# Patient Record
Sex: FEMALE | Race: WHITE | NOT HISPANIC OR LATINO | Employment: OTHER | URBAN - METROPOLITAN AREA
[De-identification: names, ages, dates, MRNs, and addresses within clinical notes are randomized per-mention and may not be internally consistent; named-entity substitution may affect disease eponyms.]

---

## 2024-11-19 ENCOUNTER — APPOINTMENT (OUTPATIENT)
Dept: RADIOLOGY | Facility: CLINIC | Age: 89
End: 2024-11-19
Attending: EMERGENCY MEDICINE
Payer: COMMERCIAL

## 2024-11-19 ENCOUNTER — HOSPITAL ENCOUNTER (EMERGENCY)
Facility: CLINIC | Age: 89
Discharge: OTHER INSTITUTION | End: 2024-11-19
Attending: EMERGENCY MEDICINE | Admitting: EMERGENCY MEDICINE
Payer: COMMERCIAL

## 2024-11-19 VITALS
OXYGEN SATURATION: 95 % | SYSTOLIC BLOOD PRESSURE: 184 MMHG | WEIGHT: 118 LBS | RESPIRATION RATE: 25 BRPM | DIASTOLIC BLOOD PRESSURE: 82 MMHG | TEMPERATURE: 97.8 F | BODY MASS INDEX: 18.52 KG/M2 | HEIGHT: 67 IN | HEART RATE: 83 BPM

## 2024-11-19 DIAGNOSIS — R05.2 SUBACUTE COUGH: ICD-10-CM

## 2024-11-19 DIAGNOSIS — D64.9 ANEMIA, UNSPECIFIED TYPE: ICD-10-CM

## 2024-11-19 LAB
ALBUMIN SERPL BCG-MCNC: 3.3 G/DL (ref 3.5–5.2)
ALP SERPL-CCNC: 69 U/L (ref 40–150)
ALT SERPL W P-5'-P-CCNC: <5 U/L (ref 0–50)
ANION GAP SERPL CALCULATED.3IONS-SCNC: 10 MMOL/L (ref 7–15)
AST SERPL W P-5'-P-CCNC: 13 U/L (ref 0–45)
ATRIAL RATE - MUSE: 78 BPM
BASOPHILS # BLD AUTO: 0 10E3/UL (ref 0–0.2)
BASOPHILS NFR BLD AUTO: 0 %
BILIRUB SERPL-MCNC: 0.4 MG/DL
BUN SERPL-MCNC: 13.3 MG/DL (ref 8–23)
CALCIUM SERPL-MCNC: 8.6 MG/DL (ref 8.8–10.4)
CHLORIDE SERPL-SCNC: 104 MMOL/L (ref 98–107)
CREAT SERPL-MCNC: 0.88 MG/DL (ref 0.51–0.95)
DIASTOLIC BLOOD PRESSURE - MUSE: NORMAL MMHG
EGFRCR SERPLBLD CKD-EPI 2021: 62 ML/MIN/1.73M2
EOSINOPHIL # BLD AUTO: 0.2 10E3/UL (ref 0–0.7)
EOSINOPHIL NFR BLD AUTO: 3 %
ERYTHROCYTE [DISTWIDTH] IN BLOOD BY AUTOMATED COUNT: 15.9 % (ref 10–15)
GLUCOSE SERPL-MCNC: 108 MG/DL (ref 70–99)
HCO3 SERPL-SCNC: 24 MMOL/L (ref 22–29)
HCT VFR BLD AUTO: 30.3 % (ref 35–47)
HGB BLD-MCNC: 9.5 G/DL (ref 11.7–15.7)
IMM GRANULOCYTES # BLD: 0 10E3/UL
IMM GRANULOCYTES NFR BLD: 0 %
INTERPRETATION ECG - MUSE: NORMAL
LYMPHOCYTES # BLD AUTO: 1.2 10E3/UL (ref 0.8–5.3)
LYMPHOCYTES NFR BLD AUTO: 22 %
MCH RBC QN AUTO: 25.3 PG (ref 26.5–33)
MCHC RBC AUTO-ENTMCNC: 31.4 G/DL (ref 31.5–36.5)
MCV RBC AUTO: 81 FL (ref 78–100)
MONOCYTES # BLD AUTO: 0.8 10E3/UL (ref 0–1.3)
MONOCYTES NFR BLD AUTO: 14 %
NEUTROPHILS # BLD AUTO: 3.3 10E3/UL (ref 1.6–8.3)
NEUTROPHILS NFR BLD AUTO: 60 %
NRBC # BLD AUTO: 0 10E3/UL
NRBC BLD AUTO-RTO: 0 /100
P AXIS - MUSE: 58 DEGREES
PLATELET # BLD AUTO: 149 10E3/UL (ref 150–450)
POTASSIUM SERPL-SCNC: 3.9 MMOL/L (ref 3.4–5.3)
PR INTERVAL - MUSE: 206 MS
PROT SERPL-MCNC: 6.5 G/DL (ref 6.4–8.3)
QRS DURATION - MUSE: 82 MS
QT - MUSE: 390 MS
QTC - MUSE: 444 MS
R AXIS - MUSE: 11 DEGREES
RBC # BLD AUTO: 3.75 10E6/UL (ref 3.8–5.2)
SODIUM SERPL-SCNC: 138 MMOL/L (ref 135–145)
SYSTOLIC BLOOD PRESSURE - MUSE: NORMAL MMHG
T AXIS - MUSE: 0 DEGREES
TROPONIN T SERPL HS-MCNC: <6 NG/L
VENTRICULAR RATE- MUSE: 78 BPM
WBC # BLD AUTO: 5.5 10E3/UL (ref 4–11)

## 2024-11-19 PROCEDURE — 93005 ELECTROCARDIOGRAM TRACING: CPT | Performed by: EMERGENCY MEDICINE

## 2024-11-19 PROCEDURE — 84520 ASSAY OF UREA NITROGEN: CPT | Performed by: EMERGENCY MEDICINE

## 2024-11-19 PROCEDURE — 82947 ASSAY GLUCOSE BLOOD QUANT: CPT | Performed by: EMERGENCY MEDICINE

## 2024-11-19 PROCEDURE — 36415 COLL VENOUS BLD VENIPUNCTURE: CPT | Performed by: EMERGENCY MEDICINE

## 2024-11-19 PROCEDURE — 84460 ALANINE AMINO (ALT) (SGPT): CPT | Performed by: EMERGENCY MEDICINE

## 2024-11-19 PROCEDURE — 85025 COMPLETE CBC W/AUTO DIFF WBC: CPT | Performed by: EMERGENCY MEDICINE

## 2024-11-19 PROCEDURE — 99285 EMERGENCY DEPT VISIT HI MDM: CPT | Mod: 25 | Performed by: EMERGENCY MEDICINE

## 2024-11-19 PROCEDURE — 84132 ASSAY OF SERUM POTASSIUM: CPT | Performed by: EMERGENCY MEDICINE

## 2024-11-19 PROCEDURE — 84484 ASSAY OF TROPONIN QUANT: CPT | Performed by: EMERGENCY MEDICINE

## 2024-11-19 PROCEDURE — 71046 X-RAY EXAM CHEST 2 VIEWS: CPT

## 2024-11-19 RX ORDER — DOXYCYCLINE 100 MG/1
100 CAPSULE ORAL 2 TIMES DAILY
Qty: 10 CAPSULE | Refills: 0 | Status: SHIPPED | OUTPATIENT
Start: 2024-11-19 | End: 2024-11-19

## 2024-11-19 RX ORDER — DOXYCYCLINE 100 MG/1
100 CAPSULE ORAL 2 TIMES DAILY
Qty: 10 CAPSULE | Refills: 0 | Status: SHIPPED | OUTPATIENT
Start: 2024-11-19

## 2024-11-19 ASSESSMENT — COLUMBIA-SUICIDE SEVERITY RATING SCALE - C-SSRS
6. HAVE YOU EVER DONE ANYTHING, STARTED TO DO ANYTHING, OR PREPARED TO DO ANYTHING TO END YOUR LIFE?: NO
2. HAVE YOU ACTUALLY HAD ANY THOUGHTS OF KILLING YOURSELF IN THE PAST MONTH?: NO
1. IN THE PAST MONTH, HAVE YOU WISHED YOU WERE DEAD OR WISHED YOU COULD GO TO SLEEP AND NOT WAKE UP?: NO

## 2024-11-19 ASSESSMENT — ACTIVITIES OF DAILY LIVING (ADL)
ADLS_ACUITY_SCORE: 0

## 2024-11-19 NOTE — ED TRIAGE NOTES
Pt presents to the ED via [facility] with c/o chest pressure for the past 2 weeks. Denies pain/pressure currently. Worse in the AM. Recently started on lisinopril.

## 2024-11-19 NOTE — DISCHARGE INSTRUCTIONS
The chest pressure is not related to your heart  Your hemoglobin is low and should be followed up as an outpatient  Chest x-ray did show some infiltrates that may be pneumonia.  Doxycycline prescribed for infection  Repeat chest x-ray in 2 weeks to ensure resolution

## 2024-11-19 NOTE — ED PROVIDER NOTES
EMERGENCY DEPARTMENT ENCOUNTER            IMPRESSION:  Chest wall secondary to cough from lisinopril  Anemia  Possible pneumonia        MEDICAL DECISION MAKING:  It was my pleasure to provide care for Silvia Rodriguez who was brought in by ambulance for chest wall pain.  She reports recently starting lisinopril for elevated blood pressure with a subsequent cough.  She has some pain of her left chest wall due to the cough.  She called her primary care doctor who referred her to the emergency department.     On my exam patient is pleasant and cooperative.   Vital signs elevated blood.  Physical exam notable for normal cardiopulmonary exam.  Some tenderness to palpation over the left chest wall    EKG independently interpreted by myself and demonstrates no acute ischemia or arrhythmia    Laboratory investigation independently interpreted by myself and notable for hemoglobin of 9.5 which is new.  Troponin is negative    Chest imaging reveals some airspace opacities.  Imaging independently interpreted by myself and reveals no focal pneumonia    Patient was reevaluated and results were discussed.     The chest pain is musculoskeletal in origin and secondary to recent cough.  She will need follow-up on the anemia and I advised her if this.  Chest x-ray did show some opacities for which may be atypical pneumonia.  I will start some doxycycline    Prior to making a final disposition on this patient the results of patient's tests and other diagnostic studies were discussed with the patient. All questions were answered. Patient expressed understanding of the plan and was amenable.    Return precautions and follow-up were discussed.     =================================================================  CHIEF COMPLAINT:  Chief Complaint   Patient presents with    Chest Pain         HPI  Silvia Rodriguez is a 89 year old female with a history of HTN, mitral valve insufficiency, HLD, CKD3, and lung nodules, who presents to the ED by  "ambulance for evaluation of chest pain and pressure.    Per chart review, the patient contacted the nurse triage line at [facility] on 11/19/2024. The patient reported she had chest pressure that lasted longer than 5 minutes. She described the pain as crushing, pressure-like, or heavy. Stated she woke up with chest pressure this morning at 0700 (11/19/24). This episode lasted for 1-1.5 hours and noted it was constant \"heavy pressure\". Currently denies pressure. Patient reported she has been having this feeling since 11/8/2024 intermittently along with a dry cough after starting 10 mg of lisinopril.    Patient comes to ED via [facility] with complaint of 2 weeks of continuous chest pressure. Per EMS, the patient reports this chest pressure was worse in the morning. Patient denies any chest pressure/pain upon arrival to the ED.    Patient reports she arrived to the ED via ambulance and notes she called the ambulance. States a couple of days ago, she switched physicians and was started on a new medication (10 mg of lisinopril). She developed chest heaviness on her left-sided chest along with a dry cough that has persistent the last 2 weeks. This morning she called the nurse triage line at [facility] and informed them about her ongoing symptoms. She was then advised to go to the ER. Patient denies having any chest pain with this chest pressure and cough. No other reported complaints or concerns at this time.    REVIEW OF SYSTEMS  Constitutional: Does not report chills, unintentional weight loss or fatigue   Eyes: Does not report visual changes or discharge    HENT: Does not report sore throat, ear pain or neck pain  Respiratory: Reports dry cough. Does not report shortness of breath    Cardiovascular: Endorses left-sided chest pressure. Does not report chest pain, palpitations or leg swelling  GI: Does not report abdominal pain, nausea, vomiting, or dark, bloody stools.    : Does not report " "hematuria, dysuria, or flank pain  Musculoskeletal: Does not report any new musculoskeletal pain or new muscle/joint pains  Skin: Does not report rash or wound  Neurologic: Does not report current headache, new weakness, focal weakness, or sensory changes        Remainder of systems reviewed, unless noted in HPI all others negative.      PAST MEDICAL HISTORY:  No past medical history on file.    PAST SURGICAL HISTORY:  No past surgical history on file.      CURRENT MEDICATIONS:    doxycycline hyclate (VIBRAMYCIN) 100 MG capsule        ALLERGIES:  Allergies   Allergen Reactions    Penicillins        FAMILY HISTORY:  No family history on file.    SOCIAL HISTORY:        PHYSICAL EXAM:    BP (!) 192/87   Pulse 77   Temp 97.8  F (36.6  C) (Oral)   Resp 18   Ht 1.702 m (5' 7\")   Wt 53.5 kg (118 lb)   SpO2 95%   BMI 18.48 kg/m    Constitutional: Awake, alert, no distress  Head: Normocephalic, atraumatic.  ENT: Mucous membranes are moist.  No pallor.   Eyes: Pupils are reactive.  No discoloration.  No nystagmus  Neck: No lymphadenopathy, no stridor, supple, no soft tissue swelling  Chest: Left parasternal tenderness to palpation  Respiratory: Respirations even, unlabored. Lungs clear to ascultation bilaterally, in no acute respiratory distress.  Cardiovascular: Regular rate and rhythm.  Good overall perfusion.  Upper and lower extremity pulses are equal.  GI: Abdomen soft, non-tender to palpation.  No guarding or rebound. Bowel sounds present throughout.   Back: No CVA tenderness.    Musculoskeletal: Moves all 4 extremities equally, full function and capacity no peripheral edema.  Full function  Integument: Warm, dry. No rash. No bruising or petechiae.  Neurologic: Alert & oriented x 3. Normal speech. Grossly normal motor and sensory function. No focal deficits noted.    Psychiatric: Normal mood and affect.  Appropriate judgement.    ED COURSE:  10:14 AM I met with the patient, obtained history, performed an initial " exam, and discussed options and plan for diagnostics and treatment here in the ED.  11:44 AM Rechecked and updated patient on lab and imaging results. Discussed plan for discharge.    Medical Decision Making    History:  Supplemental history from: Patient, EMS  External Record(s) reviewed: External medical records including care everywhere reviewed: Call to nurse triage line at [facility] for c/o chest pressure on 11/19/2024    Work Up:  EKG, laboratory and imaging studies as ordered were independently interpreted by myself.   Broad differential diagnosis considered for chest pain  The patient's presentation was of moderate complexity.     Complicating factors:  Patient has a complicated past medical history including: HTN, mitral valve insufficiency, HLD and CKD3,  Care affected by social determinants of health: Access to primary care    Disposition involved shared decision-making with the patient.    Admission was considered for chest pain however after discussion with the patient and evidence of clinical improvement patient was felt safe for discharge home.    The patient was prescribed medication toxicity    Patient otherwise to continue outpatient medications as prescribed.           LAB:  Laboratory results were independently reviewed and interpreted  Results for orders placed or performed during the hospital encounter of 11/19/24   XR Chest 2 Views    Impression    IMPRESSION: Heart size and vascularity are normal. Generalized bronchial wall thickening with reticulonodular and patchy airspace opacities right upper lobe suggesting superimposed pneumonia. 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 No pneumothorax nor pleural effusion.    Follow-up radiographs would be suggested to ensure resolution.   Comprehensive metabolic panel   Result Value Ref Range    Sodium 138 135 - 145 mmol/L    Potassium 3.9 3.4 - 5.3 mmol/L    Carbon  Dioxide (CO2) 24 22 - 29 mmol/L    Anion Gap 10 7 - 15 mmol/L    Urea Nitrogen 13.3 8.0 - 23.0 mg/dL    Creatinine 0.88 0.51 - 0.95 mg/dL    GFR Estimate 62 >60 mL/min/1.73m2    Calcium 8.6 (L) 8.8 - 10.4 mg/dL    Chloride 104 98 - 107 mmol/L    Glucose 108 (H) 70 - 99 mg/dL    Alkaline Phosphatase 69 40 - 150 U/L    AST 13 0 - 45 U/L    ALT <5 0 - 50 U/L    Protein Total 6.5 6.4 - 8.3 g/dL    Albumin 3.3 (L) 3.5 - 5.2 g/dL    Bilirubin Total 0.4 <=1.2 mg/dL   Result Value Ref Range    Troponin T, High Sensitivity <6 <=14 ng/L   CBC with platelets and differential   Result Value Ref Range    WBC Count 5.5 4.0 - 11.0 10e3/uL    RBC Count 3.75 (L) 3.80 - 5.20 10e6/uL    Hemoglobin 9.5 (L) 11.7 - 15.7 g/dL    Hematocrit 30.3 (L) 35.0 - 47.0 %    MCV 81 78 - 100 fL    MCH 25.3 (L) 26.5 - 33.0 pg    MCHC 31.4 (L) 31.5 - 36.5 g/dL    RDW 15.9 (H) 10.0 - 15.0 %    Platelet Count 149 (L) 150 - 450 10e3/uL    % Neutrophils 60 %    % Lymphocytes 22 %    % Monocytes 14 %    % Eosinophils 3 %    % Basophils 0 %    % Immature Granulocytes 0 %    NRBCs per 100 WBC 0 <1 /100    Absolute Neutrophils 3.3 1.6 - 8.3 10e3/uL    Absolute Lymphocytes 1.2 0.8 - 5.3 10e3/uL    Absolute Monocytes 0.8 0.0 - 1.3 10e3/uL    Absolute Eosinophils 0.2 0.0 - 0.7 10e3/uL    Absolute Basophils 0.0 0.0 - 0.2 10e3/uL    Absolute Immature Granulocytes 0.0 <=0.4 10e3/uL    Absolute NRBCs 0.0 10e3/uL   ECG 12-LEAD WITH MUSE (LHE)   Result Value Ref Range    Systolic Blood Pressure  mmHg    Diastolic Blood Pressure  mmHg    Ventricular Rate 78 BPM    Atrial Rate 78 BPM    MS Interval 206 ms    QRS Duration 82 ms     ms    QTc 444 ms    P Axis 58 degrees    R AXIS 11 degrees    T Axis 0 degrees    Interpretation ECG       Sinus rhythm with marked sinus arrhythmia with Premature atrial complexes with Aberrant conduction  Nonspecific ST abnormality  Abnormal ECG  No previous ECGs available  Confirmed by SEE ED PROVIDER NOTE FOR, ECG INTERPRETATION  ([id number]),  CHEPE LEWIS ([id number]) on 11/19/2024 10:33:20 AM           RADIOLOGY:  Radiology reports were independently reviewed and interpreted  XR Chest 2 Views   Final Result   IMPRESSION: Heart size and vascularity are normal. Generalized bronchial wall thickening with reticulonodular and patchy airspace opacities right upper lobe suggesting superimposed pneumonia. 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 No pneumothorax nor pleural effusion.      Follow-up radiographs would be suggested to ensure resolution.           EKG:    ECG results from 11/19/24   ECG 12-LEAD WITH MUSE (LHE)     Value    Systolic Blood Pressure     Diastolic Blood Pressure     Ventricular Rate 78    Atrial Rate 78    MT Interval 206    QRS Duration 82        QTc 444    P Axis 58    R AXIS 11    T Axis 0    Interpretation ECG      Sinus rhythm with marked sinus arrhythmia with Premature atrial complexes with Aberrant conduction  Nonspecific ST abnormality  Abnormal ECG  No previous ECGs available  Confirmed by SEE ED PROVIDER NOTE FOR, ECG INTERPRETATION ([id number]),  CHEPE LEWIS ([id number]) on 11/19/2024 10:33:20 AM         I have independently reviewed and interpreted the EKG(s) documented above.      MEDICATIONS GIVEN IN THE EMERGENCY:  Medications - No data to display        NEW PRESCRIPTIONS STARTED AT TODAY'S ER VISIT:  New Prescriptions    DOXYCYCLINE HYCLATE (VIBRAMYCIN) 100 MG CAPSULE    Take 1 capsule (100 mg) by mouth 2 times daily for 5 days.                FINAL DIAGNOSIS:    ICD-10-CM    1. Subacute cough  R05.2       2. Anemia, unspecified type  D64.9                  NAME: Silvia Rodriguez  AGE: 89 year old female  YOB: 1933  MRN: [id number]  EVALUATION DATE & TIME: 11/19/2024  9:26 AM    PCP: No primary care provider on file.    ED PROVIDER: Bob Lui M.D.      I, Kathya Campbell, am serving as a scribe to document services personally  performed by Dr. Bob Lui based on my observation and the provider's statements to me. I, Bob Lui MD attest that Kathya Campbell is acting in a scribe capacity, has observed my performance of the services and has documented them in accordance with my direction.    Bob Lui M.D.  Emergency Medicine  [facility] EMERGENCY ROOM  [address] [address]  [phone]  Dept: [phone]  11/19/2024        Bob Lui MD  11/19/24 1149

## 2024-11-19 NOTE — ED NOTES
Bed: [facility]ED-15  Expected date: 11/19/24  Expected time: 9:09 AM  Means of arrival:   Comments:  [facility]

## 2024-12-22 ENCOUNTER — APPOINTMENT (OUTPATIENT)
Dept: CT IMAGING | Facility: CLINIC | Age: 89
DRG: 308 | End: 2024-12-22
Attending: EMERGENCY MEDICINE
Payer: COMMERCIAL

## 2024-12-22 ENCOUNTER — APPOINTMENT (OUTPATIENT)
Dept: RADIOLOGY | Facility: CLINIC | Age: 89
DRG: 308 | End: 2024-12-22
Attending: EMERGENCY MEDICINE
Payer: COMMERCIAL

## 2024-12-22 ENCOUNTER — HOSPITAL ENCOUNTER (INPATIENT)
Facility: CLINIC | Age: 89
LOS: 3 days | Discharge: NURSING, REHAB OR LONG-TERM CARE | DRG: 308 | End: 2024-12-25
Attending: EMERGENCY MEDICINE | Admitting: FAMILY MEDICINE
Payer: COMMERCIAL

## 2024-12-22 DIAGNOSIS — I50.9 ACUTE DECOMPENSATED HEART FAILURE (H): ICD-10-CM
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
## 2024-12-22 DIAGNOSIS — J18.9 HOSPITAL-ACQUIRED PNEUMONIA: ICD-10-CM

## 2024-12-22 DIAGNOSIS — I48.91 ATRIAL FIBRILLATION WITH RAPID VENTRICULAR RESPONSE (H): ICD-10-CM

## 2024-12-22 LAB
ALBUMIN SERPL BCG-MCNC: 3.4 G/DL (ref 3.5–5.2)
ALBUMIN UR-MCNC: NEGATIVE MG/DL
ALP SERPL-CCNC: 74 U/L (ref 40–150)
ALT SERPL W P-5'-P-CCNC: 13 U/L (ref 0–50)
ANION GAP SERPL CALCULATED.3IONS-SCNC: 11 MMOL/L (ref 7–15)
APPEARANCE UR: CLEAR
AST SERPL W P-5'-P-CCNC: 20 U/L (ref 0–45)
ATRIAL RATE - MUSE: 102 BPM
BASOPHILS # BLD AUTO: 0 10E3/UL (ref 0–0.2)
BASOPHILS NFR BLD AUTO: 1 %
BILIRUB SERPL-MCNC: 0.5 MG/DL
BILIRUB UR QL STRIP: NEGATIVE
BUN SERPL-MCNC: 20.6 MG/DL (ref 8–23)
CALCIUM SERPL-MCNC: 9.3 MG/DL (ref 8.8–10.4)
CHLORIDE SERPL-SCNC: 104 MMOL/L (ref 98–107)
COLOR UR AUTO: COLORLESS
CREAT SERPL-MCNC: 1 MG/DL (ref 0.51–0.95)
CYSTATIN C (ROCHE): 1.2 MG/L (ref 0.6–1)
DIASTOLIC BLOOD PRESSURE - MUSE: 87 MMHG
EGFRCR SERPLBLD CKD-EPI 2021: 53 ML/MIN/1.73M2
EOSINOPHIL # BLD AUTO: 0.1 10E3/UL (ref 0–0.7)
EOSINOPHIL NFR BLD AUTO: 2 %
ERYTHROCYTE [DISTWIDTH] IN BLOOD BY AUTOMATED COUNT: 15.9 % (ref 10–15)
FLUAV RNA SPEC QL NAA+PROBE: NEGATIVE
FLUBV RNA RESP QL NAA+PROBE: NEGATIVE
GFR/BSA.PRED SERPLBLD CYS-BASED-ARV: 50 ML/MIN/1.73M2
GLUCOSE SERPL-MCNC: 167 MG/DL (ref 70–99)
GLUCOSE UR STRIP-MCNC: NEGATIVE MG/DL
HCO3 SERPL-SCNC: 23 MMOL/L (ref 22–29)
HCT VFR BLD AUTO: 30.4 % (ref 35–47)
HGB BLD-MCNC: 9.3 G/DL (ref 11.7–15.7)
HGB UR QL STRIP: NEGATIVE
IMM GRANULOCYTES # BLD: 0 10E3/UL
IMM GRANULOCYTES NFR BLD: 0 %
INR PPP: 1.39 (ref 0.85–1.15)
INTERPRETATION ECG - MUSE: NORMAL
KETONES UR STRIP-MCNC: NEGATIVE MG/DL
LEUKOCYTE ESTERASE UR QL STRIP: NEGATIVE
LIPASE SERPL-CCNC: 35 U/L (ref 13–60)
LYMPHOCYTES # BLD AUTO: 1.3 10E3/UL (ref 0.8–5.3)
LYMPHOCYTES NFR BLD AUTO: 21 %
MAGNESIUM SERPL-MCNC: 2.1 MG/DL (ref 1.7–2.3)
MCH RBC QN AUTO: 24.2 PG (ref 26.5–33)
MCHC RBC AUTO-ENTMCNC: 30.6 G/DL (ref 31.5–36.5)
MCV RBC AUTO: 79 FL (ref 78–100)
MONOCYTES # BLD AUTO: 0.8 10E3/UL (ref 0–1.3)
MONOCYTES NFR BLD AUTO: 13 %
MRSA DNA SPEC QL NAA+PROBE: POSITIVE
MUCOUS THREADS #/AREA URNS LPF: PRESENT /LPF
NEUTROPHILS # BLD AUTO: 4 10E3/UL (ref 1.6–8.3)
NEUTROPHILS NFR BLD AUTO: 63 %
NITRATE UR QL: NEGATIVE
NRBC # BLD AUTO: 0 10E3/UL
NRBC BLD AUTO-RTO: 0 /100
NT-PROBNP SERPL-MCNC: 6474 PG/ML (ref 0–1800)
P AXIS - MUSE: NORMAL DEGREES
PH UR STRIP: 6 [PH] (ref 5–7)
PLATELET # BLD AUTO: 177 10E3/UL (ref 150–450)
POTASSIUM SERPL-SCNC: 4.2 MMOL/L (ref 3.4–5.3)
PR INTERVAL - MUSE: NORMAL MS
PROCALCITONIN SERPL IA-MCNC: 0.1 NG/ML
PROT SERPL-MCNC: 6.8 G/DL (ref 6.4–8.3)
QRS DURATION - MUSE: 80 MS
QT - MUSE: 328 MS
QTC - MUSE: 471 MS
R AXIS - MUSE: 25 DEGREES
RBC # BLD AUTO: 3.84 10E6/UL (ref 3.8–5.2)
RBC URINE: 1 /HPF
RSV RNA SPEC NAA+PROBE: NEGATIVE
SA TARGET DNA: POSITIVE
SARS-COV-2 RNA RESP QL NAA+PROBE: NEGATIVE
SODIUM SERPL-SCNC: 138 MMOL/L (ref 135–145)
SP GR UR STRIP: 1.02 (ref 1–1.03)
SQUAMOUS EPITHELIAL: <1 /HPF
SYSTOLIC BLOOD PRESSURE - MUSE: 132 MMHG
T AXIS - MUSE: 60 DEGREES
TROPONIN T SERPL HS-MCNC: 10 NG/L
TROPONIN T SERPL HS-MCNC: 9 NG/L
UROBILINOGEN UR STRIP-MCNC: <2 MG/DL
VENTRICULAR RATE- MUSE: 124 BPM
WBC # BLD AUTO: 6.4 10E3/UL (ref 4–11)
WBC URINE: 1 /HPF

## 2024-12-22 PROCEDURE — 250N000011 HC RX IP 250 OP 636

## 2024-12-22 PROCEDURE — 250N000011 HC RX IP 250 OP 636: Performed by: INTERNAL MEDICINE

## 2024-12-22 PROCEDURE — 96375 TX/PRO/DX INJ NEW DRUG ADDON: CPT

## 2024-12-22 PROCEDURE — 81003 URINALYSIS AUTO W/O SCOPE: CPT

## 2024-12-22 PROCEDURE — 83880 ASSAY OF NATRIURETIC PEPTIDE: CPT | Performed by: EMERGENCY MEDICINE

## 2024-12-22 PROCEDURE — 82310 ASSAY OF CALCIUM: CPT | Performed by: EMERGENCY MEDICINE

## 2024-12-22 PROCEDURE — 250N000009 HC RX 250: Performed by: EMERGENCY MEDICINE

## 2024-12-22 PROCEDURE — 99285 EMERGENCY DEPT VISIT HI MDM: CPT | Mod: 25

## 2024-12-22 PROCEDURE — 85004 AUTOMATED DIFF WBC COUNT: CPT | Performed by: EMERGENCY MEDICINE

## 2024-12-22 PROCEDURE — 85048 AUTOMATED LEUKOCYTE COUNT: CPT | Performed by: EMERGENCY MEDICINE

## 2024-12-22 PROCEDURE — 250N000011 HC RX IP 250 OP 636: Performed by: EMERGENCY MEDICINE

## 2024-12-22 PROCEDURE — 250N000013 HC RX MED GY IP 250 OP 250 PS 637

## 2024-12-22 PROCEDURE — 87641 MR-STAPH DNA AMP PROBE: CPT | Performed by: INTERNAL MEDICINE

## 2024-12-22 PROCEDURE — 87637 SARSCOV2&INF A&B&RSV AMP PRB: CPT | Performed by: EMERGENCY MEDICINE

## 2024-12-22 PROCEDURE — 83690 ASSAY OF LIPASE: CPT | Performed by: EMERGENCY MEDICINE

## 2024-12-22 PROCEDURE — 93005 ELECTROCARDIOGRAM TRACING: CPT | Performed by: EMERGENCY MEDICINE

## 2024-12-22 PROCEDURE — 82374 ASSAY BLOOD CARBON DIOXIDE: CPT | Performed by: EMERGENCY MEDICINE

## 2024-12-22 PROCEDURE — 250N000009 HC RX 250

## 2024-12-22 PROCEDURE — 87186 SC STD MICRODIL/AGAR DIL: CPT | Performed by: INTERNAL MEDICINE

## 2024-12-22 PROCEDURE — 36415 COLL VENOUS BLD VENIPUNCTURE: CPT | Performed by: EMERGENCY MEDICINE

## 2024-12-22 PROCEDURE — 71045 X-RAY EXAM CHEST 1 VIEW: CPT

## 2024-12-22 PROCEDURE — 84484 ASSAY OF TROPONIN QUANT: CPT | Performed by: EMERGENCY MEDICINE

## 2024-12-22 PROCEDURE — 82610 CYSTATIN C: CPT | Performed by: INTERNAL MEDICINE

## 2024-12-22 PROCEDURE — 87077 CULTURE AEROBIC IDENTIFY: CPT | Performed by: INTERNAL MEDICINE

## 2024-12-22 PROCEDURE — 250N000011 HC RX IP 250 OP 636: Performed by: STUDENT IN AN ORGANIZED HEALTH CARE EDUCATION/TRAINING PROGRAM

## 2024-12-22 PROCEDURE — 85610 PROTHROMBIN TIME: CPT | Performed by: EMERGENCY MEDICINE

## 2024-12-22 PROCEDURE — 83735 ASSAY OF MAGNESIUM: CPT | Performed by: INTERNAL MEDICINE

## 2024-12-22 PROCEDURE — 71260 CT THORAX DX C+: CPT

## 2024-12-22 PROCEDURE — 84145 PROCALCITONIN (PCT): CPT | Performed by: INTERNAL MEDICINE

## 2024-12-22 PROCEDURE — 96374 THER/PROPH/DIAG INJ IV PUSH: CPT | Mod: 59

## 2024-12-22 PROCEDURE — 87633 RESP VIRUS 12-25 TARGETS: CPT | Performed by: INTERNAL MEDICINE

## 2024-12-22 PROCEDURE — 120N000004 HC R&B MS OVERFLOW

## 2024-12-22 RX ORDER — METOPROLOL TARTRATE 25 MG/1
25 TABLET, FILM COATED ORAL 2 TIMES DAILY
Status: DISCONTINUED | OUTPATIENT
Start: 2024-12-22 | End: 2024-12-23

## 2024-12-22 RX ORDER — METOPROLOL TARTRATE 1 MG/ML
5 INJECTION, SOLUTION INTRAVENOUS
Status: COMPLETED | OUTPATIENT
Start: 2024-12-22 | End: 2024-12-22

## 2024-12-22 RX ORDER — FUROSEMIDE 10 MG/ML
40 INJECTION INTRAMUSCULAR; INTRAVENOUS DAILY
Status: DISCONTINUED | OUTPATIENT
Start: 2024-12-23 | End: 2024-12-23

## 2024-12-22 RX ORDER — DILTIAZEM HCL/D5W 125 MG/125
5-15 PLASTIC BAG, INJECTION (ML) INTRAVENOUS CONTINUOUS
Status: DISCONTINUED | OUTPATIENT
Start: 2024-12-22 | End: 2024-12-23

## 2024-12-22 RX ORDER — AMOXICILLIN 250 MG
1 CAPSULE ORAL 2 TIMES DAILY PRN
Status: DISCONTINUED | OUTPATIENT
Start: 2024-12-22 | End: 2024-12-25 | Stop reason: HOSPADM

## 2024-12-22 RX ORDER — VANCOMYCIN HYDROCHLORIDE 1 G/200ML
1000 INJECTION, SOLUTION INTRAVENOUS ONCE
Status: DISCONTINUED | OUTPATIENT
Start: 2024-12-22 | End: 2024-12-22 | Stop reason: ALTCHOICE

## 2024-12-22 RX ORDER — ENOXAPARIN SODIUM 100 MG/ML
40 INJECTION SUBCUTANEOUS EVERY 24 HOURS
Status: DISCONTINUED | OUTPATIENT
Start: 2024-12-22 | End: 2024-12-22

## 2024-12-22 RX ORDER — VANCOMYCIN HYDROCHLORIDE 1 G/200ML
1000 INJECTION, SOLUTION INTRAVENOUS ONCE
Status: COMPLETED | OUTPATIENT
Start: 2024-12-22 | End: 2024-12-22

## 2024-12-22 RX ORDER — METOPROLOL TARTRATE 25 MG/1
25 TABLET, FILM COATED ORAL 2 TIMES DAILY
Status: ON HOLD | COMMUNITY
Start: 2024-12-19 | End: 2024-12-25

## 2024-12-22 RX ORDER — TIMOLOL MALEATE 5 MG/ML
1 SOLUTION/ DROPS OPHTHALMIC EVERY MORNING
COMMUNITY

## 2024-12-22 RX ORDER — CEFEPIME HYDROCHLORIDE 2 G/1
2 INJECTION, POWDER, FOR SOLUTION INTRAVENOUS EVERY 12 HOURS
Status: DISCONTINUED | OUTPATIENT
Start: 2024-12-22 | End: 2024-12-24

## 2024-12-22 RX ORDER — LIDOCAINE 40 MG/G
CREAM TOPICAL
Status: DISCONTINUED | OUTPATIENT
Start: 2024-12-22 | End: 2024-12-25 | Stop reason: HOSPADM

## 2024-12-22 RX ORDER — TIMOLOL 2.56 MG/ML
1 SOLUTION/ DROPS OPHTHALMIC EVERY MORNING
COMMUNITY
End: 2024-12-22

## 2024-12-22 RX ORDER — AMOXICILLIN 250 MG
2 CAPSULE ORAL 2 TIMES DAILY PRN
Status: DISCONTINUED | OUTPATIENT
Start: 2024-12-22 | End: 2024-12-25 | Stop reason: HOSPADM

## 2024-12-22 RX ORDER — CEFEPIME HYDROCHLORIDE 2 G/1
2 INJECTION, POWDER, FOR SOLUTION INTRAVENOUS ONCE
Status: DISCONTINUED | OUTPATIENT
Start: 2024-12-22 | End: 2024-12-22

## 2024-12-22 RX ORDER — ONDANSETRON 2 MG/ML
4 INJECTION INTRAMUSCULAR; INTRAVENOUS ONCE
Status: COMPLETED | OUTPATIENT
Start: 2024-12-22 | End: 2024-12-22

## 2024-12-22 RX ORDER — BRIMONIDINE TARTRATE 2 MG/ML
1 SOLUTION/ DROPS OPHTHALMIC 2 TIMES DAILY
COMMUNITY

## 2024-12-22 RX ORDER — BENZONATATE 100 MG/1
100 CAPSULE ORAL 3 TIMES DAILY PRN
Status: DISCONTINUED | OUTPATIENT
Start: 2024-12-22 | End: 2024-12-23

## 2024-12-22 RX ORDER — BRIMONIDINE TARTRATE 2 MG/ML
1 SOLUTION/ DROPS OPHTHALMIC 2 TIMES DAILY
Status: DISCONTINUED | OUTPATIENT
Start: 2024-12-22 | End: 2024-12-25 | Stop reason: HOSPADM

## 2024-12-22 RX ORDER — TIMOLOL MALEATE 5 MG/ML
1 SOLUTION/ DROPS OPHTHALMIC EVERY MORNING
Status: DISCONTINUED | OUTPATIENT
Start: 2024-12-23 | End: 2024-12-25 | Stop reason: HOSPADM

## 2024-12-22 RX ORDER — FUROSEMIDE 10 MG/ML
60 INJECTION INTRAMUSCULAR; INTRAVENOUS ONCE
Status: COMPLETED | OUTPATIENT
Start: 2024-12-22 | End: 2024-12-22

## 2024-12-22 RX ORDER — CALCIUM CARBONATE 500 MG/1
1000 TABLET, CHEWABLE ORAL 4 TIMES DAILY PRN
Status: DISCONTINUED | OUTPATIENT
Start: 2024-12-22 | End: 2024-12-25 | Stop reason: HOSPADM

## 2024-12-22 RX ORDER — ALBUTEROL SULFATE 0.83 MG/ML
3 SOLUTION RESPIRATORY (INHALATION)
Status: DISCONTINUED | OUTPATIENT
Start: 2024-12-22 | End: 2024-12-25 | Stop reason: HOSPADM

## 2024-12-22 RX ORDER — IOPAMIDOL 755 MG/ML
75 INJECTION, SOLUTION INTRAVASCULAR ONCE
Status: COMPLETED | OUTPATIENT
Start: 2024-12-22 | End: 2024-12-22

## 2024-12-22 RX ORDER — POLYETHYLENE GLYCOL 3350 17 G/17G
17 POWDER, FOR SOLUTION ORAL 2 TIMES DAILY PRN
Status: DISCONTINUED | OUTPATIENT
Start: 2024-12-22 | End: 2024-12-25 | Stop reason: HOSPADM

## 2024-12-22 RX ORDER — BRIMONIDINE TARTRATE 1 MG/ML
1 SOLUTION/ DROPS OPHTHALMIC 2 TIMES DAILY
COMMUNITY
End: 2024-12-22

## 2024-12-22 RX ADMIN — CEFEPIME 2 G: 2 INJECTION, POWDER, FOR SOLUTION INTRAVENOUS at 17:25

## 2024-12-22 RX ADMIN — VANCOMYCIN HYDROCHLORIDE 1000 MG: 1 INJECTION, SOLUTION INTRAVENOUS at 18:03

## 2024-12-22 RX ADMIN — BRIMONIDINE TARTRATE 1 DROP: 2 SOLUTION OPHTHALMIC at 20:05

## 2024-12-22 RX ADMIN — IOPAMIDOL 75 ML: 755 INJECTION, SOLUTION INTRAVENOUS at 14:56

## 2024-12-22 RX ADMIN — METOROPROLOL TARTRATE 5 MG: 5 INJECTION, SOLUTION INTRAVENOUS at 13:27

## 2024-12-22 RX ADMIN — Medication 5 MG/HR: at 15:53

## 2024-12-22 RX ADMIN — ONDANSETRON 4 MG: 2 INJECTION, SOLUTION INTRAMUSCULAR; INTRAVENOUS at 15:24

## 2024-12-22 RX ADMIN — METOROPROLOL TARTRATE 5 MG: 5 INJECTION, SOLUTION INTRAVENOUS at 13:20

## 2024-12-22 RX ADMIN — METOROPROLOL TARTRATE 5 MG: 5 INJECTION, SOLUTION INTRAVENOUS at 13:11

## 2024-12-22 RX ADMIN — BIMATOPROST 1 DROP: 0.1 SOLUTION/ DROPS OPHTHALMIC at 22:20

## 2024-12-22 RX ADMIN — FUROSEMIDE 60 MG: 10 INJECTION, SOLUTION INTRAMUSCULAR; INTRAVENOUS at 15:27

## 2024-12-22 RX ADMIN — APIXABAN 2.5 MG: 2.5 TABLET, FILM COATED ORAL at 20:04

## 2024-12-22 RX ADMIN — Medication 10 MG/HR: at 17:27

## 2024-12-22 ASSESSMENT — COLUMBIA-SUICIDE SEVERITY RATING SCALE - C-SSRS
6. HAVE YOU EVER DONE ANYTHING, STARTED TO DO ANYTHING, OR PREPARED TO DO ANYTHING TO END YOUR LIFE?: NO
1. IN THE PAST MONTH, HAVE YOU WISHED YOU WERE DEAD OR WISHED YOU COULD GO TO SLEEP AND NOT WAKE UP?: NO
2. HAVE YOU ACTUALLY HAD ANY THOUGHTS OF KILLING YOURSELF IN THE PAST MONTH?: NO

## 2024-12-22 ASSESSMENT — ACTIVITIES OF DAILY LIVING (ADL)
ADLS_ACUITY_SCORE: 36
ADLS_ACUITY_SCORE: 41
ADLS_ACUITY_SCORE: 43
ADLS_ACUITY_SCORE: 41

## 2024-12-22 NOTE — PHARMACY-ADMISSION MEDICATION HISTORY
Pharmacist Admission Medication History    Admission medication history is complete. The information provided in this note is only as accurate as the sources available at the time of the update.    Medication reconciliation/reorder completed by provider prior to medication history? No    Information Source(s): Patient and CareEverywhere/SureScripts via in-person    Pertinent Information: patient does not have eye drops with her.     Changes made to PTA medication list:  Added: All medications are new to this encounter    Allergies reviewed with patient and updates made in EHR: yes    Medications available for use during hospital stay: NONE.     Medication History Completed By: Silvina Caamra PharmD 12/22/2024 4:25 PM    PTA Med List   Medication Sig Last Dose/Taking    apixaban ANTICOAGULANT (ELIQUIS) 2.5 MG tablet Take 2.5 mg by mouth 2 times daily. 12/22/2024 Morning    bimatoprost (LUMIGAN) 0.01 % SOLN Place 1 drop into both eyes at bedtime. 12/21/2024 Bedtime    brimonidine (ALPHAGAN) 0.2 % ophthalmic solution Place 1 drop into the right eye 2 times daily. 12/22/2024 Morning    metoprolol tartrate (LOPRESSOR) 25 MG tablet Take 25 mg by mouth 2 times daily. 12/22/2024 Morning    timolol maleate (TIMOPTIC) 0.5 % ophthalmic solution Place 1 drop into both eyes every morning. 12/22/2024 Morning

## 2024-12-22 NOTE — ED NOTES
Pt ambulated to the bathroom and back with her cane and assist of 2 due to pt feeling weak. Pt unable to provide urine sample at this time.

## 2024-12-22 NOTE — PHARMACY-VANCOMYCIN DOSING SERVICE
"Pharmacy Vancomycin Initial Note  Date of Service 2024  Patient's  1933  89 year old, female    Indication: [facility] Pneumonia    Current estimated CrCl = Estimated Creatinine Clearance: 31.6 mL/min (A) (based on SCr of 1 mg/dL (H)).    Creatinine for last 3 days  2024:  1:03 PM Creatinine 1.00 mg/dL    Recent Vancomycin Level(s) for last 3 days  No results found for requested labs within last 3 days.      Vancomycin IV Administrations (past 72 hours)        No vancomycin orders with administrations in past 72 hours.                    Nephrotoxins and other renal medications (From now, onward)      Start     Dose/Rate Route Frequency Ordered Stop    24 1700  vancomycin (VANCOCIN) 750 mg in 0.9% NaCl 257.5 mL intermittent infusion        Placed in \"Followed by\" Linked Group    750 mg  over 60 Minutes Intravenous EVERY 24 HOURS 24 1656      24 0800  furosemide (LASIX) injection 40 mg         40 mg  over 1-3 Minutes Intravenous DAILY 24 1650      24 1700  vancomycin (VANCOCIN) 1,000 mg in NaCl 0.9% 200 mL intermittent infusion        Placed in \"Followed by\" Linked Group    1,000 mg  over 60 Minutes Intravenous ONCE 24 1656              Contrast Orders - past 72 hours (72h ago, onward)      Start     Dose/Rate Route Frequency Stop    24 1500  iopamidol (ISOVUE-370) solution 75 mL         75 mL Intravenous ONCE 24 1456            [employer] Prediction of Planned Initial Vancomycin Regimen  Loading dose: 1000 mg at 16:00 2024.  Regimen: 750 mg IV every 24 hours.  Start time: 16:00 on 2024  Exposure target: AUC24 (range)400-600 mg/L.hr   AUC24,ss: 461 mg/L.hr  Probability of AUC24 > 400: 67 %  Ctrough,ss: 14.7 mg/L  Probability of Ctrough,ss > 20: 20 %  Probability of nephrotoxicity (Lodise RACHEAL ): 10 %        Plan:  Start vancomycin  1000 mg IV once followed by 750 mg every 24 hours. Initial loading dose from previous " consult note was not given at the time this note was written, therefore, order was updated to link the loading and maintenance dose.  Vancomycin monitoring method: AUC  Vancomycin therapeutic monitoring goal: 400-600 mg*h/L  Pharmacy will check vancomycin levels as appropriate in 1-3 Days.    Serum creatinine levels will be ordered daily for the first week of therapy and at least twice weekly for subsequent weeks.      Silvina Camara, PharmD

## 2024-12-22 NOTE — ED PROVIDER NOTES
EMERGENCY DEPARTMENT ENCOUNTER      NAME: Silvia Rodriguez  AGE: 89 year old female  YOB: 1933  MRN: [id number]  EVALUATION DATE & TIME: 12/22/2024 12:46 PM    PCP: [facility]    ED PROVIDER: Darío Rust MD    Chief Complaint   Patient presents with    Generalized Weakness    Irregular Heart Beat     FINAL IMPRESSION:  1. Acute decompensated heart failure (H)    2. Atrial fibrillation with rapid ventricular response (H)    3. Hospital-acquired pneumonia      ED COURSE & MEDICAL DECISION MAKING:    Pertinent Labs & Imaging studies reviewed. (See chart for details)  89 year old female presents to the Emergency Department for evaluation of shortness of breath weakness fatigue irregular heartbeat.  Recent diagnosis of atrial fibrillation and recent hospitalization for pulmonary edema possible pneumonia on antibiotics complete antibiotic course.  Discharged on 1219.  Since discharge persistent escalating symptoms weakness shortness of breath fatigue palpitations malaise.  Ultimately presenting to the emergency department for assessment.  Noted to be in atrial fibrillation with RVR.  Patient is on metoprolol so administered 5 dosages of 5 mg of metoprolol with no impact on her heart rate.  Subsequently transition to diltiazem drip for better rate control.  Overall patient was ill-appearing.  Oxygenating in the low 90s.  Hypertensive.  Viral studies negative.  BNP elevated at 6500.  Troponin was negative.  CBC notable for anemia.  Went on to have CT scan imaging which was remarkable for evidence of CHF and pneumonia.  Would characterize as hospital-associated given recent hospitalization.  Administered cefepime and vancomycin.  Also initiated diuresis with the CHF and elevated BNP.  Overall patient needs admission to the hospital for further care and management and admitting services patient.         At the conclusion of the encounter I discussed the results of all of the tests and the  disposition. The questions were answered. The patient or family acknowledged understanding and was agreeable with the care plan.     MEDICATIONS GIVEN IN THE EMERGENCY:  Medications   diltiazem (CARDIZEM) 125 mg in dextrose 5 % 125 mL infusion (has no administration in time range)   ceFEPIme (MAXIPIME) 2 g vial to attach to  mL bag for ADULTS or NS 50 mL bag for PEDS (has no administration in time range)   metoprolol (LOPRESSOR) injection 5 mg (5 mg Intravenous $Given 12/22/24 1327)   furosemide (LASIX) injection 60 mg (60 mg Intravenous $Given 12/22/24 1527)   iopamidol (ISOVUE-370) solution 75 mL (75 mLs Intravenous $Given 12/22/24 1456)   ondansetron (ZOFRAN) injection 4 mg (4 mg Intravenous $Given 12/22/24 1524)       NEW PRESCRIPTIONS STARTED AT TODAY'S ER VISIT  New Prescriptions    No medications on file     Medical Decision Making  Obtained supplemental history:Supplemental history obtained?: EMS  Reviewed external records: External records reviewed?: Outpatient Record: [facility] 12/17  Care impacted by chronic illness:Anticoagulated State, Chronic Kidney Disease, Chronic Lung Disease, Heart Disease, and Hypertension  Did you consider but not order tests?: Work up considered but not performed and documented in chart, if applicable  Did you interpret images independently?: Independent interpretation of ECG and images noted in documentation, when applicable.  Consultation discussion with other provider:Did you involve another provider (consultant, MH, pharmacy, etc.)?: I discussed the care with another health care provider, see documentation for details.  Admit.    MIPS: Not Applicable    =================================================================    HPI    Patient information was obtained from: patient and EMS    Use of : N/A      Silvia Rodriguez is a 89 year old female with a pertinent history of A-fib, hypertension, and CKD3 who presents to this ED via EMS for evaluation of weakness.    Per  EMS, patient was in A-fib with a rate of 120 en route.     Per patient, she has been feeling weak and fatigued since she was discharged from the hospital on 12/19 after developing pneumonia and new A-fib. She developed chest heaviness last night that radiates into the epigastric abdomen. Notes her stools have been slightly darker than normal, but no consistency changes or tarry/bloody stools. She has finished her antibiotics course for the pneumonia. Reports a lingering cough. Denies sore throat or fever.    Admitted to [facility] on 12/17 with A-fib and RVR. Treated for RUL pneumonia. Reported worsening dyspnea, lingering dry cough, and non radiating chest pressure. CXR with bilatral upper lobe infiltrates. Received 5mg IV metoprolol x3 and Lasix. Put on IV heparin during admission and switched to Eliquis upon discharge. Echo with EF of 50-55%, mild-moderate mitral regurgitation, and mild pulmonary hypertension. Discharged 12/19 with recommendation to start iron supplement outpatient.    REVIEW OF SYSTEMS   See HPI, otherwise negative.    PAST MEDICAL HISTORY:  No past medical history on file.    PAST SURGICAL HISTORY:  No past surgical history on file.        CURRENT MEDICATIONS:    doxycycline hyclate (VIBRAMYCIN) 100 MG capsule        ALLERGIES:  Allergies   Allergen Reactions    Penicillins        FAMILY HISTORY:  No family history on file.    SOCIAL HISTORY:   Social History     Socioeconomic History    Marital status:      Social Drivers of Health     Financial Resource Strain: Low Risk  (11/6/2024)    [facility]    Financial Resource Strain     Difficulty of Paying Living Expenses: 3   Food Insecurity: No Food Insecurity (12/17/2024)    [facility]    Food Insecurity     Do you worry your food will run out before you are able to buy more?: 1   Transportation Needs: No Transportation Needs (12/17/2024)     "Received from [facility]    Transportation Needs     Does lack of transportation keep you from medical appointments?: 1     Does lack of transportation keep you from work, meetings or getting things that you need?: 1   Social Connections: Socially Integrated (12/17/2024)    Received from [facility]    Social Connections     Do you often feel lonely or isolated from those around you?: 0   Housing Stability: Low Risk  (12/17/2024)    Received from [facility]    Housing Stability     What is your housing situation today?: 1       VITALS:  BP (!) 151/96   Pulse 118   Temp 97.2  F (36.2  C) (Temporal)   Resp 18   Ht 1.702 m (5' 7\")   Wt 53.5 kg (118 lb)   SpO2 90%   BMI 18.48 kg/m      PHYSICAL EXAM    Constitutional: Well developed, Well nourished, appears short of breath and fatigued  HENT: Normocephalic, Atraumatic, Bilateral external ears normal, Oropharynx normal, mucous membranes moist, Nose normal. Neck-  Normal range of motion, No tenderness, Supple, No stridor.   Eyes: PERRL, EOMI, Conjunctiva normal, No discharge.   Respiratory: Normal breath sounds, No respiratory distress, No wheezing, Speaks full sentences easily. No cough.   Cardiovascular: Irregularly irregular tachycardia  GI:  Soft, No tenderness, No masses, No flank tenderness. No rebound or guarding.  : No cva tenderness    Musculoskeletal: 2+ DP pulses. No edema. No cyanosis. Good range of motion in all major joints. No tenderness to palpation. No tenderness of the CTLS spine.   Integument: Warm, Dry, No erythema, No rash. No petechiae.   Neurologic: Alert & oriented x 3, Normal motor function, Normal sensory function, No focal deficits noted.   Psychiatric: Affect normal, Judgment normal, Mood normal. Cooperative.        LAB:  All pertinent labs reviewed and interpreted.  Results for orders placed or performed during the hospital encounter " of 12/22/24   XR Chest Port 1 View    Impression    IMPRESSION: Cardiac silhouette is within normal limits for size. Calcifications of the thoracic aorta. No significant change and patchy opacities of the right upper lung and bilateral lung bases. Probable small bilateral pleural effusions are unchanged.   No discernible pneumothorax.   CT Chest w Contrast    Impression    IMPRESSION:   1.  Patchy bilateral regions of pulmonary consolidation raising the possibility of bilateral pneumonia.  2.  Moderate bilateral pleural effusions, cardiomegaly, suggestion of pulmonary edema consistent with congestive heart failure.  3.  Several enlarged thoracic lymph nodes appear larger.     Result Value Ref Range    INR 1.39 (H) 0.85 - 1.15   Comprehensive metabolic panel   Result Value Ref Range    Sodium 138 135 - 145 mmol/L    Potassium 4.2 3.4 - 5.3 mmol/L    Carbon Dioxide (CO2) 23 22 - 29 mmol/L    Anion Gap 11 7 - 15 mmol/L    Urea Nitrogen 20.6 8.0 - 23.0 mg/dL    Creatinine 1.00 (H) 0.51 - 0.95 mg/dL    GFR Estimate 53 (L) >60 mL/min/1.73m2    Calcium 9.3 8.8 - 10.4 mg/dL    Chloride 104 98 - 107 mmol/L    Glucose 167 (H) 70 - 99 mg/dL    Alkaline Phosphatase 74 40 - 150 U/L    AST 20 0 - 45 U/L    ALT 13 0 - 50 U/L    Protein Total 6.8 6.4 - 8.3 g/dL    Albumin 3.4 (L) 3.5 - 5.2 g/dL    Bilirubin Total 0.5 <=1.2 mg/dL   Result Value Ref Range    Lipase 35 13 - 60 U/L   Result Value Ref Range    Troponin T, High Sensitivity 10 <=14 ng/L   Nt probnp inpatient (BNP)   Result Value Ref Range    N terminal Pro BNP Inpatient 6,474 (H) 0 - 1,800 pg/mL   Influenza A/B, RSV and SARS-CoV2 PCR (COVID-19) Nasopharyngeal    Specimen: Nasopharyngeal; Swab   Result Value Ref Range    Influenza A PCR Negative Negative    Influenza B PCR Negative Negative    RSV PCR Negative Negative    SARS CoV2 PCR Negative Negative   CBC with platelets and differential   Result Value Ref Range    WBC Count 6.4 4.0 - 11.0 10e3/uL    RBC Count 3.84 3.80  - 5.20 10e6/uL    Hemoglobin 9.3 (L) 11.7 - 15.7 g/dL    Hematocrit 30.4 (L) 35.0 - 47.0 %    MCV 79 78 - 100 fL    MCH 24.2 (L) 26.5 - 33.0 pg    MCHC 30.6 (L) 31.5 - 36.5 g/dL    RDW 15.9 (H) 10.0 - 15.0 %    Platelet Count 177 150 - 450 10e3/uL    % Neutrophils 63 %    % Lymphocytes 21 %    % Monocytes 13 %    % Eosinophils 2 %    % Basophils 1 %    % Immature Granulocytes 0 %    NRBCs per 100 WBC 0 <1 /100    Absolute Neutrophils 4.0 1.6 - 8.3 10e3/uL    Absolute Lymphocytes 1.3 0.8 - 5.3 10e3/uL    Absolute Monocytes 0.8 0.0 - 1.3 10e3/uL    Absolute Eosinophils 0.1 0.0 - 0.7 10e3/uL    Absolute Basophils 0.0 0.0 - 0.2 10e3/uL    Absolute Immature Granulocytes 0.0 <=0.4 10e3/uL    Absolute NRBCs 0.0 10e3/uL   ECG 12-LEAD WITH MUSE (LHE)   Result Value Ref Range    Systolic Blood Pressure 132 mmHg    Diastolic Blood Pressure 87 mmHg    Ventricular Rate 124 BPM    Atrial Rate 102 BPM    MN Interval  ms    QRS Duration 80 ms     ms    QTc 471 ms    P Axis  degrees    R AXIS 25 degrees    T Axis 60 degrees    Interpretation ECG       Atrial fibrillation with rapid ventricular response  Low voltage QRS  Nonspecific ST abnormality  Abnormal ECG  When compared with ECG of 19-Nov-2024 09:28,  Atrial fibrillation has replaced Sinus rhythm  Vent. rate has increased by  46 bpm  Confirmed by SEE ED PROVIDER NOTE FOR, ECG INTERPRETATION ([id number]),  ABI GALEANA ([id number]) on 12/22/2024 1:15:01 PM         RADIOLOGY:  Reviewed all pertinent imaging. Please see official radiology report.  CT Chest w Contrast   Final Result   IMPRESSION:    1.  Patchy bilateral regions of pulmonary consolidation raising the possibility of bilateral pneumonia.   2.  Moderate bilateral pleural effusions, cardiomegaly, suggestion of pulmonary edema consistent with congestive heart failure.   3.  Several enlarged thoracic lymph nodes appear larger.         XR Chest Port 1 View   Final Result   IMPRESSION: Cardiac silhouette is within normal  limits for size. Calcifications of the thoracic aorta. No significant change and patchy opacities of the right upper lung and bilateral lung bases. Probable small bilateral pleural effusions are unchanged.    No discernible pneumothorax.          EKG:    Performed at: 1255  Atrial fibrillation  Rapid ventricular response  Heart rate 124  ST segments per nonischemic  No ectopy  In comparison to ECG from November 2020 for atrial fibrillation has replaced sinus rhythm  Clinical impression atrial fibrillation with rapid ventricular response  I have independently reviewed and interpreted the EKG(s) documented above.    PROCEDURES:   NA      I, Magdalena Hernadez, am serving as a scribe to document services personally performed by Darío Rust MD based on my observation and the provider's statements to me. I, Darío Rust MD, attest that Magdalena Hernadez is acting in a scribe capacity, has observed my performance of the services and has documented them in accordance with my direction.    Darío Rust MD  [facility] EMERGENCY ROOM  [facility] [facility] [address] [phone]        Darío Rust MD  12/23/24 [id number]

## 2024-12-22 NOTE — H&P
[facility]    History and Physical - Hospitalist Service       Date of Admission:  12/22/2024    Assessment & Plan      Silvia Rodriguez is a 89 year old female admitted on 12/22/2024. She has a history of  and is admitted for atrial fibrillation with RVR with possible acute on chronic HFpEF and pneumonia.       #Atrial fibrillation with RVR  #Pulmonary edema  #Possible acute on chronic heart failure with preserved ejection fraction  Heart rates have been in the 130-120s admission.  Recent echo her last admission (12/17-12/19) Echo that admission showed EF in 50-55%, mild-mod MR, and mild pulmonary HTN.  CT of the chest did show concern for pulmonary edema,.  Picture of acute on chronic heart failure exacerbated by A-fib with RVR.  -Continue apixaban  -Hold home PTA metoprolol  -Diltiazem drip  -Telemetry  -Strict I's and O's  -Daily weights  - start Lasix 40 mg IV daily tomorrow AM     #Concern for hospital acquired pneumonia  # Acute cough secondary   CT of the chest showed concern bilateral pneumonia.  Her recent hospitalization was to treat this as hospital-acquired.  Patient is allergic to penicillin but looks like she is able to tolerate cephalosporins.  Calcitonin and back normal making viral cause of her pneumonia likely.  However, continue antibiotics for now.   -Cefepime  -Vanco  -Sputum culture  -Legionella urinary antigen and Streptococcus pneumonia antigens  -Tessalon Perles as needed for cough      # Microcytic anemia  microcytic anemia. Hgb between 8.7-10.1 here. Iron sat is 6% and ferritin 15 indicating iron deficiency anemia     # CKD stage III  Creatinine is at 1.  Due to her low BMI, the Creatinine might be over estimating her GFR.  -Cystatin C / GFR       Chronic medications:  Keratoconjunctivitis sicca: Continue PTA timolol and brimonidine eyedrops          Diet: Regular Diet Adult  DVT Prophylaxis: Eliquis  Denson Catheter: Not present  Fluids: PO  Lines: None      Cardiac Monitoring: ACTIVE order. Indication: Tachyarrhythmias, acute (48 hours)  Code Status: No CPR- Do NOT Intubate    Clinically Significant Risk Factors Present on Admission               # Hypoalbuminemia: Lowest albumin = 3.4 g/dL at 12/22/2024  1:03 PM, will monitor as appropriate    # Drug Induced Coagulation Defect: home medication list includes an anticoagulant medication         # Anemia: based on hgb <11           # Financial/Environmental Concerns: none         Disposition Plan      Expected Discharge Date: 12/24/2024                The patient's care was discussed with the Attending Physician, Dr. Liriano  .      Tricia Bedolla MD  Hospitalist Service  [facility]  Securely message with [employer] (more info)  Text page via [facility] Paging/Directory   ______________________________________________________________________    Chief Complaint   Weakness, cough, abdominal discomfort    History of Present Illness     Silvia Rodriguez is a(n) 89 y.o. with a history of HTN and stage III CKD, who who will be admitted due to atrial fibrillation with RVR and concern for pneumonia and exacerbation of her HFpEF.   She was seen in the ER in 11/19 and was treated for RUL pneumonia (received doxycyline and completed the course)  and has had a persistent dry cough since then. In the next  2-3 weeks she has had progressively worsening dyspnea. This was initially just with exertion but now occurs at rest as well. Also describes non-radiating left-sided chest pain/pressure that does not worsen with exertion she again went to [facility] (12/17-12/19) was admitted for A-fib with RVR and pulmonary edema which she received Lasix, metoprolol, and diltiazem drip. Echo that admission showed EF in 50-55%, mild-mod MR, and mild pulmonary HTN .  Patient was not discharged on diuretics but was discharged on started Eliquis at discharge for atrial fibrillation to prevent stroke.  Patient was discharged 3 days ago from  [facility].  Patient felt like she did not really recovered, developed this abdominal pain with dry cough, and weakness and eventually brought her to the ED of this hospital.  Denies any diarrhea, chest pain, or dyspnea..  Does endorse possible sick contact at her nursing home.  Patient is allergic to penicillins.  Patient denies ever smoking.  Denies any history of cardiac surgery or coronary stent placement.  Only major surgery was an appendectomy.      Prior to Admission Medications   Prior to Admission Medications   Prescriptions Last Dose Informant Patient Reported? Taking?   apixaban ANTICOAGULANT (ELIQUIS) 2.5 MG tablet 12/22/2024 Morning  Yes Yes   Sig: Take 2.5 mg by mouth 2 times daily.   bimatoprost (LUMIGAN) 0.01 % SOLN 12/21/2024 Bedtime  Yes Yes   Sig: Place 1 drop into both eyes at bedtime.   brimonidine (ALPHAGAN) 0.2 % ophthalmic solution 12/22/2024 Morning  Yes Yes   Sig: Place 1 drop into the right eye 2 times daily.   metoprolol tartrate (LOPRESSOR) 25 MG tablet 12/22/2024 Morning  Yes Yes   Sig: Take 25 mg by mouth 2 times daily.   timolol maleate (TIMOPTIC) 0.5 % ophthalmic solution 12/22/2024 Morning  Yes Yes   Sig: Place 1 drop into both eyes every morning.      Facility-Administered Medications: None        Social History   I have reviewed this patient's social history and updated it with pertinent information if needed.         Family History           Allergies   Allergies   Allergen Reactions    Penicillins         Physical Exam   Vital Signs: Temp: 97.2  F (36.2  C) Temp src: Temporal BP: (!) 154/104 Pulse: (!) 130   Resp: 22 SpO2: 94 % O2 Device: None (Room air)    Weight: 118 lbs 0 oz    Constitutional: Patient appears fatigued but not in acute distress  HENT: Normocephalic, Atraumatic  Respiratory: Wheezing, rhonchi heard, coarse crackles at the bases of the lungs.  Cardiovascular: Irregularly irregular tachycardia  GI:  Soft, No tenderness, No masses, No flank tenderness. No rebound or  guarding.  : No cva tenderness    Musculoskeletal: 2+ DP pulses. No edema. No cyanosis. Good range of motion in all major joints. No tenderness to palpation. No tenderness of the CTLS spine.   Integument: Warm, Dry, No erythema, No rash. No petechiae.   Neurologic: Alert & oriented x 3, Normal motor function, Normal sensory function, No focal deficits noted.   Psychiatric: Affect normal, Judgment normal, Mood normal. Cooperative.    Medical Decision Making       Tests personally interpreted in the past 24 hours:      Data   Imaging results reviewed over the past 24 hrs:   Recent Results (from the past 24 hours)   XR Chest Port 1 View    Narrative    EXAM: XR CHEST PORT 1 VIEW  LOCATION: [facility]  DATE: 12/22/2024    INDICATION: anterior chest pain  COMPARISON: 12/19/2024.      Impression    IMPRESSION: Cardiac silhouette is within normal limits for size. Calcifications of the thoracic aorta. No significant change and patchy opacities of the right upper lung and bilateral lung bases. Probable small bilateral pleural effusions are unchanged.   No discernible pneumothorax.   CT Chest w Contrast    Narrative    EXAM: CT CHEST WITH CONTRAST  LOCATION: [facility]  DATE: 12/22/2024    INDICATION: Shortness of breath, hypoxia.  COMPARISON: Chest x-ray 12/22/2024. CT 09/18/2013.  TECHNIQUE: CT chest with IV contrast. Multiplanar reformats were obtained. Dose reduction techniques were used.    CONTRAST: Isovue 370, 75 mL.    FINDINGS:   LUNGS AND PLEURA: Moderate bilateral pleural effusions. Bilateral smooth interstitial prominence. Patchy areas of consolidation noted at the posterior right upper lobe, superior right lower lobe, and periphery of the left upper lobe. Superimposed hazy   groundglass opacities bilaterally.    MEDIASTINUM/AXILLAE: No adenopathy. No visualized pulmonary embolism. No acute thoracic aortic abnormality. Several enlarged lymph nodes seen at the  bilateral mediastinum. One example anterior to the right main bronchus is 20 mm series 4 image 63. There   are other examples. Cardiomegaly.    CORONARY ARTERY CALCIFICATION: Minimal.    UPPER ABDOMEN: No acute abnormality.    MUSCULOSKELETAL: Unremarkable.      Impression    IMPRESSION:   1.  Patchy bilateral regions of pulmonary consolidation raising the possibility of bilateral pneumonia.  2.  Moderate bilateral pleural effusions, cardiomegaly, suggestion of pulmonary edema consistent with congestive heart failure.  3.  Several enlarged thoracic lymph nodes appear larger.

## 2024-12-23 ENCOUNTER — APPOINTMENT (OUTPATIENT)
Dept: ULTRASOUND IMAGING | Facility: CLINIC | Age: 89
DRG: 308 | End: 2024-12-23
Attending: INTERNAL MEDICINE
Payer: COMMERCIAL

## 2024-12-23 ENCOUNTER — APPOINTMENT (OUTPATIENT)
Dept: PHYSICAL THERAPY | Facility: CLINIC | Age: 89
DRG: 308 | End: 2024-12-23
Payer: COMMERCIAL

## 2024-12-23 ENCOUNTER — APPOINTMENT (OUTPATIENT)
Dept: CARDIOLOGY | Facility: CLINIC | Age: 89
DRG: 308 | End: 2024-12-23
Attending: INTERNAL MEDICINE
Payer: COMMERCIAL

## 2024-12-23 LAB
ANION GAP SERPL CALCULATED.3IONS-SCNC: 10 MMOL/L (ref 7–15)
APPEARANCE FLD: CLEAR
BASOPHILS # BLD AUTO: 0.1 10E3/UL (ref 0–0.2)
BASOPHILS NFR BLD AUTO: 1 %
BUN SERPL-MCNC: 20.2 MG/DL (ref 8–23)
C PNEUM DNA SPEC QL NAA+PROBE: NOT DETECTED
CALCIUM SERPL-MCNC: 9 MG/DL (ref 8.8–10.4)
CELL COUNT BODY FLUID SOURCE: NORMAL
CHLORIDE SERPL-SCNC: 102 MMOL/L (ref 98–107)
COLOR FLD: YELLOW
CREAT SERPL-MCNC: 1.08 MG/DL (ref 0.51–0.95)
EGFRCR SERPLBLD CKD-EPI 2021: 49 ML/MIN/1.73M2
EOSINOPHIL # BLD AUTO: 0.3 10E3/UL (ref 0–0.7)
EOSINOPHIL NFR BLD AUTO: 4 %
EOSINOPHIL NFR FLD MANUAL: 1 %
ERYTHROCYTE [DISTWIDTH] IN BLOOD BY AUTOMATED COUNT: 16 % (ref 10–15)
FLUAV H1 2009 PAND RNA SPEC QL NAA+PROBE: NOT DETECTED
FLUAV H1 RNA SPEC QL NAA+PROBE: NOT DETECTED
FLUAV H3 RNA SPEC QL NAA+PROBE: NOT DETECTED
FLUAV RNA SPEC QL NAA+PROBE: NOT DETECTED
FLUBV RNA SPEC QL NAA+PROBE: NOT DETECTED
GLUCOSE BODY FLUID SOURCE: NORMAL
GLUCOSE FLD-MCNC: 162 MG/DL
GLUCOSE SERPL-MCNC: 109 MG/DL (ref 70–99)
HADV DNA SPEC QL NAA+PROBE: NOT DETECTED
HCO3 SERPL-SCNC: 26 MMOL/L (ref 22–29)
HCOV PNL SPEC NAA+PROBE: NOT DETECTED
HCT VFR BLD AUTO: 28.1 % (ref 35–47)
HGB BLD-MCNC: 8.7 G/DL (ref 11.7–15.7)
HMPV RNA SPEC QL NAA+PROBE: NOT DETECTED
HPIV1 RNA SPEC QL NAA+PROBE: NOT DETECTED
HPIV2 RNA SPEC QL NAA+PROBE: NOT DETECTED
HPIV3 RNA SPEC QL NAA+PROBE: NOT DETECTED
HPIV4 RNA SPEC QL NAA+PROBE: NOT DETECTED
IMM GRANULOCYTES # BLD: 0 10E3/UL
IMM GRANULOCYTES NFR BLD: 1 %
L PNEUMO1 AG UR QL IA: NEGATIVE
LD BODY BODY FLUID SOURCE: NORMAL
LDH FLD L TO P-CCNC: 81 U/L
LDH SERPL L TO P-CCNC: 207 U/L (ref 0–250)
LVEF ECHO: NORMAL
LYMPHOCYTES # BLD AUTO: 1.3 10E3/UL (ref 0.8–5.3)
LYMPHOCYTES NFR BLD AUTO: 20 %
LYMPHOCYTES NFR FLD MANUAL: 83 %
M PNEUMO DNA SPEC QL NAA+PROBE: NOT DETECTED
MCH RBC QN AUTO: 24 PG (ref 26.5–33)
MCHC RBC AUTO-ENTMCNC: 31 G/DL (ref 31.5–36.5)
MCV RBC AUTO: 78 FL (ref 78–100)
MONOCYTES # BLD AUTO: 1.3 10E3/UL (ref 0–1.3)
MONOCYTES NFR BLD AUTO: 19 %
MONOS+MACROS NFR FLD MANUAL: 10 %
NEUTROPHILS # BLD AUTO: 3.7 10E3/UL (ref 1.6–8.3)
NEUTROPHILS NFR BLD AUTO: 56 %
NEUTS BAND NFR FLD MANUAL: 6 %
NRBC # BLD AUTO: 0 10E3/UL
NRBC BLD AUTO-RTO: 0 /100
PHOSPHATE SERPL-MCNC: 3.6 MG/DL (ref 2.5–4.5)
PLATELET # BLD AUTO: 157 10E3/UL (ref 150–450)
POTASSIUM SERPL-SCNC: 3.7 MMOL/L (ref 3.4–5.3)
PROT FLD-MCNC: 1.9 G/DL
PROT SERPL-MCNC: 6.2 G/DL (ref 6.4–8.3)
PROTEIN BODY FLUID SOURCE: NORMAL
RBC # BLD AUTO: 3.62 10E6/UL (ref 3.8–5.2)
RSV RNA SPEC QL NAA+PROBE: NOT DETECTED
RSV RNA SPEC QL NAA+PROBE: NOT DETECTED
RV+EV RNA SPEC QL NAA+PROBE: NOT DETECTED
S PNEUM AG SPEC QL: NEGATIVE
SODIUM SERPL-SCNC: 138 MMOL/L (ref 135–145)
SPECIMEN TYPE: NORMAL
WBC # BLD AUTO: 6.6 10E3/UL (ref 4–11)
WBC # FLD AUTO: 1250 /UL

## 2024-12-23 PROCEDURE — 250N000009 HC RX 250

## 2024-12-23 PROCEDURE — 97110 THERAPEUTIC EXERCISES: CPT | Mod: GP

## 2024-12-23 PROCEDURE — 250N000011 HC RX IP 250 OP 636: Performed by: INTERNAL MEDICINE

## 2024-12-23 PROCEDURE — 258N000003 HC RX IP 258 OP 636: Performed by: STUDENT IN AN ORGANIZED HEALTH CARE EDUCATION/TRAINING PROGRAM

## 2024-12-23 PROCEDURE — 99223 1ST HOSP IP/OBS HIGH 75: CPT | Mod: AI | Performed by: INTERNAL MEDICINE

## 2024-12-23 PROCEDURE — 84100 ASSAY OF PHOSPHORUS: CPT | Performed by: INTERNAL MEDICINE

## 2024-12-23 PROCEDURE — 36415 COLL VENOUS BLD VENIPUNCTURE: CPT | Performed by: INTERNAL MEDICINE

## 2024-12-23 PROCEDURE — 250N000011 HC RX IP 250 OP 636: Performed by: STUDENT IN AN ORGANIZED HEALTH CARE EDUCATION/TRAINING PROGRAM

## 2024-12-23 PROCEDURE — 272N000710 US THORACENTESIS

## 2024-12-23 PROCEDURE — 84311 SPECTROPHOTOMETRY: CPT | Performed by: INTERNAL MEDICINE

## 2024-12-23 PROCEDURE — 255N000002 HC RX 255 OP 636: Performed by: INTERNAL MEDICINE

## 2024-12-23 PROCEDURE — 84155 ASSAY OF PROTEIN SERUM: CPT | Performed by: INTERNAL MEDICINE

## 2024-12-23 PROCEDURE — 120N000004 HC R&B MS OVERFLOW

## 2024-12-23 PROCEDURE — 250N000013 HC RX MED GY IP 250 OP 250 PS 637: Performed by: INTERNAL MEDICINE

## 2024-12-23 PROCEDURE — 89051 BODY FLUID CELL COUNT: CPT | Performed by: INTERNAL MEDICINE

## 2024-12-23 PROCEDURE — 97161 PT EVAL LOW COMPLEX 20 MIN: CPT | Mod: GP

## 2024-12-23 PROCEDURE — 82945 GLUCOSE OTHER FLUID: CPT | Performed by: INTERNAL MEDICINE

## 2024-12-23 PROCEDURE — 250N000011 HC RX IP 250 OP 636

## 2024-12-23 PROCEDURE — 32555 ASPIRATE PLEURA W/ IMAGING: CPT

## 2024-12-23 PROCEDURE — 87899 AGENT NOS ASSAY W/OPTIC: CPT | Performed by: INTERNAL MEDICINE

## 2024-12-23 PROCEDURE — 83615 LACTATE (LD) (LDH) ENZYME: CPT | Performed by: INTERNAL MEDICINE

## 2024-12-23 PROCEDURE — 85025 COMPLETE CBC W/AUTO DIFF WBC: CPT | Performed by: INTERNAL MEDICINE

## 2024-12-23 PROCEDURE — 250N000013 HC RX MED GY IP 250 OP 250 PS 637

## 2024-12-23 PROCEDURE — 84157 ASSAY OF PROTEIN OTHER: CPT | Performed by: INTERNAL MEDICINE

## 2024-12-23 PROCEDURE — 97116 GAIT TRAINING THERAPY: CPT | Mod: GP

## 2024-12-23 PROCEDURE — 87070 CULTURE OTHR SPECIMN AEROBIC: CPT | Performed by: INTERNAL MEDICINE

## 2024-12-23 PROCEDURE — 80048 BASIC METABOLIC PNL TOTAL CA: CPT | Performed by: INTERNAL MEDICINE

## 2024-12-23 PROCEDURE — 93306 TTE W/DOPPLER COMPLETE: CPT | Mod: 26 | Performed by: INTERNAL MEDICINE

## 2024-12-23 PROCEDURE — 82374 ASSAY BLOOD CARBON DIOXIDE: CPT | Performed by: INTERNAL MEDICINE

## 2024-12-23 RX ORDER — FUROSEMIDE 20 MG/1
20 TABLET ORAL DAILY
Status: DISCONTINUED | OUTPATIENT
Start: 2024-12-23 | End: 2024-12-25 | Stop reason: HOSPADM

## 2024-12-23 RX ORDER — METOPROLOL TARTRATE 50 MG
50 TABLET ORAL 2 TIMES DAILY
Status: DISCONTINUED | OUTPATIENT
Start: 2024-12-23 | End: 2024-12-24

## 2024-12-23 RX ORDER — GUAIFENESIN 200 MG/1
200 TABLET ORAL EVERY 4 HOURS PRN
Status: DISCONTINUED | OUTPATIENT
Start: 2024-12-23 | End: 2024-12-25 | Stop reason: HOSPADM

## 2024-12-23 RX ORDER — FUROSEMIDE 10 MG/ML
20 INJECTION INTRAMUSCULAR; INTRAVENOUS DAILY
Status: DISCONTINUED | OUTPATIENT
Start: 2024-12-23 | End: 2024-12-23

## 2024-12-23 RX ORDER — BENZONATATE 100 MG/1
100 CAPSULE ORAL 3 TIMES DAILY PRN
Status: DISCONTINUED | OUTPATIENT
Start: 2024-12-23 | End: 2024-12-23

## 2024-12-23 RX ADMIN — VANCOMYCIN HYDROCHLORIDE 750 MG: 5 INJECTION, POWDER, LYOPHILIZED, FOR SOLUTION INTRAVENOUS at 21:13

## 2024-12-23 RX ADMIN — PERFLUTREN 2 ML: 6.52 INJECTION, SUSPENSION INTRAVENOUS at 13:45

## 2024-12-23 RX ADMIN — APIXABAN 2.5 MG: 2.5 TABLET, FILM COATED ORAL at 08:36

## 2024-12-23 RX ADMIN — BRIMONIDINE TARTRATE 1 DROP: 2 SOLUTION OPHTHALMIC at 08:37

## 2024-12-23 RX ADMIN — CEFEPIME 2 G: 2 INJECTION, POWDER, FOR SOLUTION INTRAVENOUS at 18:53

## 2024-12-23 RX ADMIN — FUROSEMIDE 20 MG: 20 TABLET ORAL at 08:35

## 2024-12-23 RX ADMIN — METOPROLOL TARTRATE 50 MG: 50 TABLET, FILM COATED ORAL at 12:07

## 2024-12-23 RX ADMIN — Medication 5 MG/HR: at 08:41

## 2024-12-23 RX ADMIN — TIMOLOL MALEATE 1 DROP: 5 SOLUTION OPHTHALMIC at 08:37

## 2024-12-23 RX ADMIN — BIMATOPROST 1 DROP: 0.1 SOLUTION/ DROPS OPHTHALMIC at 21:20

## 2024-12-23 RX ADMIN — CEFEPIME 2 G: 2 INJECTION, POWDER, FOR SOLUTION INTRAVENOUS at 04:59

## 2024-12-23 RX ADMIN — APIXABAN 2.5 MG: 2.5 TABLET, FILM COATED ORAL at 21:06

## 2024-12-23 RX ADMIN — BRIMONIDINE TARTRATE 1 DROP: 2 SOLUTION OPHTHALMIC at 21:11

## 2024-12-23 RX ADMIN — METOPROLOL TARTRATE 50 MG: 50 TABLET, FILM COATED ORAL at 21:06

## 2024-12-23 ASSESSMENT — ACTIVITIES OF DAILY LIVING (ADL)
ADLS_ACUITY_SCORE: 37
DEPENDENT_IADLS:: INDEPENDENT
ADLS_ACUITY_SCORE: 36
ADLS_ACUITY_SCORE: 37
ADLS_ACUITY_SCORE: 37
ADLS_ACUITY_SCORE: 39
ADLS_ACUITY_SCORE: 37
ADLS_ACUITY_SCORE: 39
ADLS_ACUITY_SCORE: 37
ADLS_ACUITY_SCORE: 36
ADLS_ACUITY_SCORE: 37
ADLS_ACUITY_SCORE: 36
ADLS_ACUITY_SCORE: 36
ADLS_ACUITY_SCORE: 39
ADLS_ACUITY_SCORE: 37
ADLS_ACUITY_SCORE: 37
ADLS_ACUITY_SCORE: 36
ADLS_ACUITY_SCORE: 37
ADLS_ACUITY_SCORE: 36
ADLS_ACUITY_SCORE: 37
ADLS_ACUITY_SCORE: 36
ADLS_ACUITY_SCORE: 36

## 2024-12-23 NOTE — PLAN OF CARE
Goal Outcome Evaluation:      Plan of Care Reviewed With: patient          Outcome Evaluation: Pt to retuen to [facility]

## 2024-12-23 NOTE — PROGRESS NOTES
12/23/24 1128   Appointment Info   Signing Clinician's Name / Credentials (PT) Dario Sagastume, PT   Quick Adds   Quick Adds Certification   Living Environment   People in Home alone   Current Living Arrangements apartment   Home Accessibility no concerns   Transportation Anticipated agency   Self-Care   Usual Activity Tolerance moderate   Current Activity Tolerance fair   Equipment Currently Used at Home cane, straight;walker, rolling;grab bar, toilet;grab bar, tub/shower;shower chair   Fall history within last six months no   Activity/Exercise/Self-Care Comment Indep with all I ADL's and ADL's   General Information   Onset of Illness/Injury or Date of Surgery 12/22/24   Referring Physician Dr. Reji Garcia   Patient/Family Therapy Goals Statement (PT) Wanting to go back to her apartment   Pertinent History of Current Problem (include personal factors and/or comorbidities that impact the POC) 89 year old female admitted on 12/22/2024. She has a history of  and is admitted for atrial fibrillation with RVR with possible acute on chronic HFpEF and pneumonia.   Existing Precautions/Restrictions no known precautions/restrictions   Cognition   Affect/Mental Status (Cognition) WNL   Range of Motion (ROM)   Range of Motion ROM is WFL   Strength (Manual Muscle Testing)   Strength Comments general weakness   Transfers   Transfers sit-stand transfer   Sit-Stand Transfer   Sit-Stand [place] (Transfers) contact guard;verbal cues   Assistive Device (Sit-Stand Transfers) walker, front-wheeled   Gait/Stairs (Locomotion)   [place] Level (Gait) contact guard;verbal cues   Assistive Device (Gait) walker, front-wheeled   Distance in Feet (Gait) 20   Pattern (Gait) swing-through   Deviations/Abnormal Patterns (Gait) yg decreased;gait speed decreased   Balance   Balance no deficits were identified   Clinical Impression   Criteria for Skilled Therapeutic Intervention Yes, treatment indicated   PT Diagnosis (PT) Impaired  functional mobility   Influenced by the following impairments weakness,decreased activity tolerance   Functional limitations due to impairments transfers, gait   Clinical Presentation (PT Evaluation Complexity) stable   Clinical Presentation Rationale Patient presents as medically diagnosed   Clinical Decision Making (Complexity) low complexity   Planned Therapy Interventions (PT) home exercise program;gait training;stair training;transfer training;patient/family education   Risk & Benefits of therapy have been explained patient   PT Total Evaluation Time   PT Eval, Low Complexity Minutes (86366) 10   Therapy Certification   Start of care date 12/23/24   Certification date from 12/23/24   Certification date to 12/30/24   Physical Therapy Goals   PT Frequency Daily   PT Predicted Duration/Target Date for Goal Attainment 12/30/24   PT Goals Transfers;Gait;PT Goal 1   PT: Transfers Modified independent;Sit to/from stand   PT: Gait Modified independent;Rolling walker;150 feet   PT: Goal 1 Indep with HEP   Interventions   Interventions Quick Adds Gait Training;Therapeutic Procedure   Therapeutic Procedure/Exercise   Ther. Procedure: strength, endurance, ROM, flexibillity Minutes (61330) 9   Symptoms Noted During/After Treatment fatigue   Treatment Detail/Skilled Intervention Sitting HEP x 10, instructed in use of incentive spriometer and educated on pursed lip breathing   Gait Training   Gait Training Minutes (67386) 14   Symptoms Noted During/After Treatment (Gait Training) fatigue   Treatment Detail/Skilled Intervention slow stable gait, vc to push up from recliner chair with STS . STS with SBA .   Patient fatigued quickly on first ambulation , but then tolerated more 2nd time.  VC for use of walker.  Patient has walkers at home but at baseline uses cane.  On RA and able to maintain 92% or better on oximeter   Distance in Feet 50,80   [place] Level (Gait Training) stand-by assist   Physical Assistance Level (Gait  Training) verbal cues;supervision;1 person assist   Weight Bearing (Gait Training) full weight-bearing   Assistive Device (Gait Training) rolling walker   Pattern Analysis (Gait Training) swing-through gait   Gait Analysis Deviations decreased yg;decreased step length;decreased stride length   Impairments (Gait Analysis/Training) strength decreased   PT Discharge Planning   PT Plan progress with gait/transfers, HEP motivated to return to home   PT Discharge Recommendation (DC Rec) home with home care physical therapy   PT Rationale for DC Rec Patient wanting to return to home, has good home setup and moving relatively well considering has been sick for past 2 weeks   PT Brief overview of current status Paitent SBA for 80' with use of FWW, STS SBA   PT Equipment Needed at Discharge walker, rolling   Physical Therapy Time and Intention   Timed Code Treatment Minutes 23   Total Session Time (sum of timed and untimed services) 33   Discharge Needs Assessment (IRF)   Transportation Concerns none

## 2024-12-23 NOTE — CONSULTS
INITIAL PULMONARY   12/23/2024      Admit Date: 12/22/2024  Hospital Day: 1   CODE: No CPR- Do NOT Intubate    Reason for Consult: Pleural effusions in a patient with heart failure      Assessment/Plan:   Silvia Rodriguez is a 89 year old female with a past medical history significant for atrial fibrillation, CKD stage III, hyperlipidemia, RLS and bronchiectasis admitted for shortness of breath at rest.  Of note, she was recently discharged from [facility] on 12/19 when she was admitted for A-fib with RVR and with pulmonary edema.  Notably, she was not discharged on any diuretics and imaging at the time did not demonstrate remarkable pleural effusions.  Imaging on this admission demonstrates moderate pleural effusions right greater than left likely compatible with heart failure.    Recommend:  BL Pleural effusions, A-fib with RVR: Etiology as above.  Underwent an echocardiogram last week which demonstrated an ejection fraction of 50-55% with mild to moderate MR and diastolic dysfunction was not able to be estimated.  She presents with A-fib with RVR and worsening effusions which are likely contributing to her dyspnea on exertion.  Continue gentle diuresis as you are.  Underwent a right-sided thoracentesis which was consistent with a transudate.  Maintain SpO2 greater than 92%.  ? Sarcoid: Found somewhere in her past medical history.  Suspect that her lymphadenopathy is more related to heart failure presently.  Regardless, given her advanced age and DNR status, do not think it is relevant to pursue diagnostic evaluation for sarcoid even if this were to be present.      Thank you for involving us in the care of this patient.    Tila Dunlap MD  Pulmonary and Critical Care  [facility]      HPI:   CCx: SOB, thoracic LAD    HPI:Mr. Rodriguez is a 89 year old female with a past medical history significant for atrial fibrillation, CKD stage III, hyperlipidemia, RLS and bronchiectasis admitted for shortness of breath at rest. She  "was discharged from [facility] on 12/19 after having a similar admission and was found to be in Afib with RVR, she was diuresed and discharged on anticoagulation for her A-Fib and AV sonia blocking drugs.  She states that she has been feeling poorly over the last month and has not really been eating or drinking much.  She does note that last Saturday, she went to the [facility] with her family and began to feel unwell the day after.      ROS: Pertinent positives alluded to in the HPI. Remainder of 10 point ROS is negative.                                                                                                                                                       Medical/Surgical history:  A-Fib  CKD Stage 3  Hyperlipidemia  RLS  Bronchiectasis    Allergies:  Reviewed in Epic    PTA medications:  Medications Prior to Admission   Medication Sig Dispense Refill Last Dose/Taking    apixaban ANTICOAGULANT (ELIQUIS) 2.5 MG tablet Take 2.5 mg by mouth 2 times daily.   12/22/2024 Morning    bimatoprost (LUMIGAN) 0.01 % SOLN Place 1 drop into both eyes at bedtime.   12/21/2024 Bedtime    brimonidine (ALPHAGAN) 0.2 % ophthalmic solution Place 1 drop into the right eye 2 times daily.   12/22/2024 Morning    metoprolol tartrate (LOPRESSOR) 25 MG tablet Take 25 mg by mouth 2 times daily.   12/22/2024 Morning    timolol maleate (TIMOPTIC) 0.5 % ophthalmic solution Place 1 drop into both eyes every morning.   12/22/2024 Morning       Family Hx:  Reviewed.     Social Hx:  Reviewed.     Exam/Data:   ROS: 10 point ROS obtained, pertinant positives alluded to in HPI    Vitals  /55 (BP Location: Right arm)   Pulse 71   Temp 97.7  F (36.5  C) (Oral)   Resp 18   Ht 1.702 m (5' 7\")   Wt 53.1 kg (117 lb 1.6 oz)   SpO2 96%   BMI 18.34 kg/m    BP - Mean:  [] 110  I/O last 3 completed shifts:  In: 100 [IV Piggyback:100]  Out: 2075 [Urine:2075]  Weight change:   [unfilled]  EXAM:  Physical Exam  Constitutional:       " Appearance: Normal appearance.   HENT:      Head: Normocephalic.      Mouth/Throat:      Mouth: Mucous membranes are moist.   Cardiovascular:      Rate and Rhythm: Normal rate and regular rhythm.   Pulmonary:      Effort: Pulmonary effort is normal.      Breath sounds: Normal breath sounds.   Abdominal:      General: Abdomen is flat.      Palpations: Abdomen is soft.   Skin:     General: Skin is warm.   Neurological:      General: No focal deficit present.      Mental Status: She is alert and oriented to person, place, and time.           Medications:     Current Facility-Administered Medications   Medication Dose Route Frequency Provider Last Rate Last Admin    No anticoagulants IF patient has had acute trauma/surgery or recent intracranial, GI or urinary tract bleeding.    Other DOES NOT GO TO MAR Tricia Bedolla MD         Current Facility-Administered Medications   Medication Dose Route Frequency Provider Last Rate Last Admin    apixaban ANTICOAGULANT (ELIQUIS) tablet 2.5 mg  2.5 mg Oral BID Reji Garcia MD   2.5 mg at 12/23/24 0836    bimatoprost (LUMIGAN) 0.01 % ophthalmic drops 1 drop  1 drop Both Eyes At Bedtime Reji Garcia MD   1 drop at 12/22/24 2220    brimonidine (ALPHAGAN) 0.2 % ophthalmic solution 1 drop  1 drop Right Eye BID Reji Garcia MD   1 drop at 12/23/24 0837    ceFEPIme (MAXIPIME) 2 g vial to attach to  mL bag for ADULTS or NS 50 mL bag for PEDS  2 g Intravenous Q12H Tricia Bedolla MD 0 mL/hr at 12/22/24 1757 2 g at 12/23/24 0459    furosemide (LASIX) tablet 20 mg  20 mg Oral Daily Tricia Bedolla MD   20 mg at 12/23/24 0835    metoprolol tartrate (LOPRESSOR) tablet 50 mg  50 mg Oral BID Tricia Bedolla MD   50 mg at 12/23/24 1207    sodium chloride (PF) 0.9% PF flush 3 mL  3 mL Intracatheter Q8H Tricia Bedolla MD   3 mL at 12/23/24 0836    timolol maleate (TIMOPTIC) 0.5 % ophthalmic solution 1 drop  1 drop Both Eyes QAM Reji Garcia MD   1 drop at 12/23/24 0837    vancomycin  (VANCOCIN) 750 mg in 0.9% NaCl 257.5 mL intermittent infusion  750 mg Intravenous Q24H Christina Liriano MD             DATA  All laboratory and radiology has been personally reviewed by myself today.  Recent Labs   Lab 12/23/24  0602   WBC 6.6   HGB 8.7*   HCT 28.1*        Recent Labs   Lab 12/23/24  0602 12/22/24  1303    138   CO2 26 23   BUN 20.2 20.6   ALKPHOS  --  74   ALT  --  13   AST  --  20       MICRO:  Date Source Organism   12/22 NP Swab Negative for Influenza A, B, RSV and Sars CoV2    NP Swab Negative for MRSA    NP Swab Negative Respiratory viral panel    NP Swab MRSA   12/23 Urine Negative for legionella and strep pneumo     Organism Antibiotic Antibiotic Start Date Antibiotic End Date   NGTD Cefepime 12/23 Ongoing    Vancomycin 12/23 Ongoing     IMAGING:   CT Chest w/ Contrast 12/22/24:  1.  Patchy bilateral regions of pulmonary consolidation raising the possibility of bilateral pneumonia.  2.  Moderate bilateral pleural effusions, cardiomegaly, suggestion of pulmonary edema consistent with congestive heart failure.  3.  Several enlarged thoracic lymph nodes appear larger.    CXR 12/19/24:  Right upper lobe infiltrates similar to previous. Bibasilar infiltrate/atelectasis unchanged. Probable small effusions stable. No definite change from previous.     TTE 12/18/24:  1.  Normal left ventricular size with low normal left ventricular systolic function.  Estimated left ventricular ejection fraction 50 to 55%  with [id number] variability due to atrial fibrillation.  2.  Mitral annular calcification with moderate/severe mitral valve regurgitation.  No significant mitral inflow stenosis.  Difficult to  quantify mitral valve regurgitation.  3.  Normal right ventricular size and systolic function.  Estimated right ventricular systolic pressure 51 mmHg.  4.  Mild tricuspid valve regurgitation.  5.  Dilated inferior vena cava with minimal inspiratory collapse.  6.  No prior echocardiogram  available for comparison.   Estimated EF: 50-55%    FINDINGS  Left Ventricle Normal left ventricular chamber size. Low normal left ventricular systolic function. Calculated left ventricular ejection fraction (modified Sifuentes technique) is 53 %. No regional wall motion abnormalities.  Normal left  ventricular wall thickness.  Diastolic Function Indeterminate left ventricular diastolic function.  Right Ventricle Normal right ventricular chamber size. Normal right ventricular systolic function. Estimated right ventricular systolic pressure is 51 mmHg.  Left Atrium Left atrial enlargement (visual estimate).  Right Atrium Right atrial enlargement.  Atrial Septum Atrial septum not well-visualized.  Aortic Valve Sclerotic tricuspid aortic valve. No aortic valve stenosis. No aortic valve regurgitation.  Mitral Valve Sclerotic mitral valve. Moderate mitral annular calcification. Mitral valve diastolic mean gradient is 2 mmHg (at a  HR of 77 bpm). Moderate/severe mitral valve regurgitation.  Tricuspid Valve Normal tricuspid valve. Mild tricuspid valve regurgitation.  Pulmonic Valve Pulmonary valve not well visualized.  Trivial pulmonary valve regurgitation.  Pericardium No pericardial effusion.  Aorta Normal aortic sinus of Valsalva dimension. Normal ascending aorta dimension.  Inferior Vena Cava Dilated inferior vena cava size with minimal inspiratory collapse.       Tila Dunlap MD  Pulmonary and Critical Care  [facility]

## 2024-12-23 NOTE — CONSULTS
Care Management Initial Consult    General Information  Assessment completed with: Patient,    Type of CM/SW Visit: Initial Assessment    Primary Care Provider verified and updated as needed:     Readmission within the last 30 days: previous discharge plan unsuccessful   Return Category: Progression of disease  Reason for Consult: discharge planning  Advance Care Planning: Advance Care Planning Reviewed: no concerns identified  none on file       Communication Assessment  Patient's communication style: spoken language (English or Bilingual)    Hearing Difficulty or Deaf: no   Wear Glasses or Blind: yes    Cognitive  Cognitive/Neuro/Behavioral: WDL                      Living Environment:   People in home: facility resident  [place]  Current living Arrangements: independent living facility  Name of Facility: [facility]   Able to return to prior arrangements: yes       Family/Social Support:  Care provided by: self  Provides care for: no one  Marital Status:   Support system: Children          Description of Support System: Supportive, Involved    Support Assessment: Adequate family and caregiver support    Current Resources:   Patient receiving home care services: No        Community Resources: None  Equipment currently used at home: cane, straight, walker, rolling, grab bar, toilet, grab bar, tub/shower, shower chair  Supplies currently used at home: None    Employment/Financial:  Employment Status: retired        Financial Concerns: none   Referral to Financial Worker: No       Does the patient's insurance plan have a 3 day qualifying hospital stay waiver?  No    Lifestyle & Psychosocial Needs:  Social Drivers of Health     Food Insecurity: Low Risk  (12/22/2024)    Food Insecurity     Within the past 12 months, did you worry that your food would run out before you got money to buy more?: No     Within the past 12 months, did the food you bought just not last and you didn t have money to get more?: No    Depression: Not at risk (11/6/2024)    [facility]    PHQ-2     PHQ-2 TOTAL SCORE: 0   Housing Stability: Low Risk  (12/22/2024)    Housing Stability     Do you have housing? : Yes     Are you worried about losing your housing?: No   Tobacco Use: Low Risk  (11/6/2024)    Received from [facility]    Patient History     Smoking Tobacco Use: Never     Smokeless Tobacco Use: Never     Passive Exposure: Not on file   Financial Resource Strain: Low Risk  (12/22/2024)    Financial Resource Strain     Within the past 12 months, have you or your family members you live with been unable to get utilities (heat, electricity) when it was really needed?: No   Alcohol Use: Not on file   Transportation Needs: Low Risk  (12/22/2024)    Transportation Needs     Within the past 12 months, has lack of transportation kept you from medical appointments, getting your medicines, non-medical meetings or appointments, work, or from getting things that you need?: No   Physical Activity: Not on file   Interpersonal Safety: Low Risk  (12/22/2024)    Interpersonal Safety     Do you feel physically and emotionally safe where you currently live?: Yes     Within the past 12 months, have you been hit, slapped, kicked or otherwise physically hurt by someone?: No     Within the past 12 months, have you been humiliated or emotionally abused in other ways by your partner or ex-partner?: No   Stress: Not on file   Social Connections: Socially Integrated (12/17/2024)    Received from [facility]    Social Connections     Do you often feel lonely or isolated from those around you?: 0   Health Literacy: Not on file       Functional Status:  Prior to admission patient needed assistance:   Dependent ADLs:: Ambulation-cane, Independent  Dependent IADLs:: Independent       Mental Health Status:  Mental Health Status: No Current Concerns        Chemical Dependency Status:  Chemical Dependency Status: No Current Concerns             Values/Beliefs:  Spiritual, Cultural Beliefs, Latter day Practices, Values that affect care: no  Description of Beliefs that Will Affect Care: [place]       Values/Beliefs Comment: pt follows  [employer] practices    Discussed  Partnership in Safe Discharge Planning  document with patient/family: Yes: pt verbalized understanding    Additional Information:  Silvia Rodriguez is a(n) 89 year old year old female who presented with Hospital-acquired pneumonia [J18.9, Y95]  Atrial fibrillation with rapid ventricular response (H) [I48.91]  Acute decompensated heart failure (H) [I50.9].     CM spoke with pt at bedside. Introduced self and role. Patient assisted with completing assessment. Patient lives in a(n) independent living facility alone. Patient is independent with IADL/ADLs. Anticipated that patient will discharge to home.    Pt lives at [facility] in the Independent Living. Pt states she receives no services from the [facility] and is independent. She uses a cane when out of her apartment.     Pt states that she will need transport upon discharge.     Pt denies any further needs at this time.    Contacts:  Extended Emergency Contact Information  Primary Emergency Contact: JENNIFERAMEEDAVID GIBSON  Mobile Phone: [phone]  Relation: Daughter      Next Steps; PT is recommending home care. Referral was sent.     Continued Care and Services - Admitted Since 12/22/2024       Destination       Service Provider Request Status Services Address Phone Fax Patient Preferred    [facility] (SNF) Pending - No Request Sent -- [address] [facility] [id number] [phone] --       Current Capacity last updated by Michelle Arnold MA on 7/11/2024 12:21 PM    *Send referral when pt is medically ready, typically reviewed same day                       Home Medical Care       Service Provider Request Status Services  Address Phone Fax Patient Preferred    [facility] - Intake/Referral Declined  Alternative agency selected, Payer/insurance denied or not accepted -- [address] [place] [address] [phone] --       Current Capacity last updated by Angelika Proctor RN on 1/1/2024 10:35 AM    Referral Hub Sat/[place] [phone]                                 Neymar Diaz RN

## 2024-12-23 NOTE — PROGRESS NOTES
[facility]    Medicine Progress Note - Hospitalist Service    Date of Admission:  12/22/2024    Assessment & Plan  Silvia Rodriguez is a 89 year old female admitted on 12/22/2024. She has a history of  and is admitted for atrial fibrillation with RVR with possible acute on chronic HFpEF and pneumonia.         #Atrial fibrillation with RVR  # Bilateral pleural effusion  # Lymphadenopathy of the thoracic lymph nodes  # History of sarcoidosis  #Possible acute on chronic heart failure with preserved ejection fraction  # Concern for pulmonary edema secondary to above  # Acute cough secondary to above  Heart rates have been in the 130-120s admission.  Recent echo her last admission (12/17-12/19) Echo that admission showed EF in 50-55%, mild-mod MR, and mild pulmonary HTN.  CT of the chest did show concern for pulmonary edema.  CT did show lymphadenopathy of the thoracic lymph nodes that appear to be larger.  Based on her chart she does have a remote history of sarcoidosis.  It is unclear,  if these enlarged lymph nodes are due to sarcoidosis or other processes infection.  Might consider pleurocentesis. However, her presentation is consistent with acute on chronic heart failure exacerbated by A-fib with RVR.  Patient has put out some good urine output overnight (net: -1.975). Will diurese with oral versus IV Lasix.  Patient seems rather dry.  Her rates have been under control.  We will discontinue diltiazem and start her metoprolol to tartrate because her A-fib seems to be rate control currently.   -Pulmonology consult pleurocentesis, with pleural analysis  -Continue apixaban  -Start metoprolol tartrate 50 mg twice daily  -Discontinue diltiazem drip   -Strict I's and O's  -Daily weights  -Continue Lasix 20 mg oral daily      #Concern for hospital acquired pneumonia    CT of the chest showed bilateral opacities concerning bilateral pneumonia.  Her recent hospitalization was to treat this as  hospital-acquired.  Patient is afebrile continues not to have leukocytosis, making pneumonia unlikely because of her cough, specially in the context of normal procalcitonin and negative respiratory panel for viruses and Legionella and strep urinary antigens.  Will await paracentesis and fluid analysis to determine whether to continue antibiotics.    -Continue cefepime  -Continue Vanco  -Discontinue Tessalon Perles as needed for cough  -Start guaifenesin as needed        # Microcytic anemia  Hgb usually between 8.7-10.1. Iron sat is 6% and ferritin 15 from previous hospital stay in [facility] indicate iron deficiency anemia.  Hemoglobin did drop to 8.7 today but within the usual range.  Will continue to monitor  -CBC daily     # CKD stage III   Slight bump in creatinine to 1.08 from 1 probably due to diuretics.  The Cystatin C level computes EGFR of 50 mL/minute.   -Continue Lasix       Chronic medications:  Keratoconjunctivitis sicca: Continue PTA timolol and brimonidine eyedrops           Diet: Regular Diet Adult    DVT Prophylaxis: DOAC  Denson Catheter: Not present  Lines: None     Cardiac Monitoring: ACTIVE order. Indication: Tachyarrhythmias, acute (48 hours)  Code Status: No CPR- Do NOT Intubate      Clinically Significant Risk Factors Present on Admission               # Hypoalbuminemia: Lowest albumin = 3.4 g/dL at 12/22/2024  1:03 PM, will monitor as appropriate  # Drug Induced Coagulation Defect: home medication list includes an anticoagulant medication         # Anemia: based on hgb <11           # Financial/Environmental Concerns: none         Social Drivers of Health    Housing Stability: High Risk (12/22/2024)    Housing Stability     Do you have housing? : No     Are you worried about losing your housing?: No          Disposition Plan     Medically Ready for Discharge: Anticipated in 2-4 Days           The patient's care was discussed with the Attending Physician, Dr. Gayle .    Tricia Bedolla,  MD  Hospitalist Service  [facility]  Securely message with [employer] (more info)  Text page via [employer] Paging/Directory   ______________________________________________________________________    Interval History   Patient still complains of a dry cough little bit of shortness of breath that is really unchanged from yesterday.  Her epigastric pain has resolved.  Heart rate has been mostly in the 160s-170s overnight.  Oxygen requirement decreased to 1 L from 2 L nasal cannula.       Physical Exam   Vital Signs: Temp: 97.8  F (36.6  C) Temp src: Oral BP: (!) 158/76 Pulse: 70   Resp: 18 SpO2: 95 % O2 Device: Nasal cannula Oxygen Delivery: 1 LPM  Weight: 117 lbs 1.6 oz  Tele: Afib, rate controlled with PVCs and pauses.    Urine output: 2.075 L, net - 2.275 L    Constitutional: Patient appears fatigued but not in acute distress  HENT: Normocephalic, Atraumatic  Respiratory: Mostly clear to auscultation.  No wheezing or rhonchi.  Crackles were heard in the bases of the lungs.  Cardiovascular: Irregularly irregular rhythm.   GI:  Soft, No tenderness, No masses, No flank tenderness. No rebound or guarding.  : No cva tenderness    Musculoskeletal: 2+ DP pulses. No edema. No cyanosis. Good range of motion in all major joints. No tenderness to palpation. No tenderness of the CTLS spine.   Integument: Warm, Dry, No erythema, No rash. No petechiae.   Neurologic: Alert & oriented x 3, Normal motor function, Normal sensory function, No focal deficits noted.   Psychiatric: Affect normal, Judgment normal, Mood normal. Cooperative.    Medical Decision Making             Data   Imaging results reviewed over the past 24 hrs:   Recent Results (from the past 24 hours)   XR Chest Port 1 View    Narrative    EXAM: XR CHEST PORT 1 VIEW  LOCATION: [facility]  DATE: 12/22/2024    INDICATION: anterior chest pain  COMPARISON: 12/19/2024.      Impression    IMPRESSION: Cardiac silhouette is  within normal limits for size. Calcifications of the thoracic aorta. No significant change and patchy opacities of the right upper lung and bilateral lung bases. Probable small bilateral pleural effusions are unchanged.   No discernible pneumothorax.   CT Chest w Contrast    Narrative    EXAM: CT CHEST WITH CONTRAST  LOCATION: [facility]  DATE: 12/22/2024    INDICATION: Shortness of breath, hypoxia.  COMPARISON: Chest x-ray 12/22/2024. CT 09/18/2013.  TECHNIQUE: CT chest with IV contrast. Multiplanar reformats were obtained. Dose reduction techniques were used.    CONTRAST: Isovue 370, 75 mL.    FINDINGS:   LUNGS AND PLEURA: Moderate bilateral pleural effusions. Bilateral smooth interstitial prominence. Patchy areas of consolidation noted at the posterior right upper lobe, superior right lower lobe, and periphery of the left upper lobe. Superimposed hazy   groundglass opacities bilaterally.    MEDIASTINUM/AXILLAE: No adenopathy. No visualized pulmonary embolism. No acute thoracic aortic abnormality. Several enlarged lymph nodes seen at the bilateral mediastinum. One example anterior to the right main bronchus is 20 mm series 4 image 63. There   are other examples. Cardiomegaly.    CORONARY ARTERY CALCIFICATION: Minimal.    UPPER ABDOMEN: No acute abnormality.    MUSCULOSKELETAL: Unremarkable.      Impression    IMPRESSION:   1.  Patchy bilateral regions of pulmonary consolidation raising the possibility of bilateral pneumonia.  2.  Moderate bilateral pleural effusions, cardiomegaly, suggestion of pulmonary edema consistent with congestive heart failure.  3.  Several enlarged thoracic lymph nodes appear larger.

## 2024-12-23 NOTE — PROGRESS NOTES
12/23/24 1128   Appointment Info   Signing Clinician's Name / Credentials (PT) Dario Sagastume, PT   Quick Adds   Quick Adds Certification   Living Environment   People in Home alone   Current Living Arrangements apartment   Home Accessibility no concerns   Transportation Anticipated agency   Self-Care   Usual Activity Tolerance moderate   Current Activity Tolerance fair   Equipment Currently Used at Home cane, straight;walker, rolling;grab bar, toilet;grab bar, tub/shower;shower chair   Fall history within last six months no   Activity/Exercise/Self-Care Comment Indep with all I ADL's and ADL's   General Information   Onset of Illness/Injury or Date of Surgery 12/22/24   Cognition   Affect/Mental Status (Cognition) WNL   Range of Motion (ROM)   Range of Motion ROM is WFL   Strength (Manual Muscle Testing)   Strength Comments general weakness   Transfers   Transfers sit-stand transfer   Sit-Stand Transfer   Sit-Stand [place] (Transfers) contact guard;verbal cues   Assistive Device (Sit-Stand Transfers) walker, front-wheeled   Gait/Stairs (Locomotion)   [place] Level (Gait) contact guard;verbal cues   Assistive Device (Gait) walker, front-wheeled   Distance in Feet (Gait) 20   Pattern (Gait) swing-through   Deviations/Abnormal Patterns (Gait) yg decreased;gait speed decreased   Balance   Balance no deficits were identified   Clinical Impression   Criteria for Skilled Therapeutic Intervention Yes, treatment indicated   PT Diagnosis (PT) Impaired functional mobility   Influenced by the following impairments weakness,decreased activity tolerance   Functional limitations due to impairments transfers, gait   Clinical Presentation (PT Evaluation Complexity) stable   Clinical Presentation Rationale Patient presents as medically diagnosed   Clinical Decision Making (Complexity) low complexity   Planned Therapy Interventions (PT) home exercise program;gait training;stair training;transfer  training;patient/family education   Risk & Benefits of therapy have been explained patient   PT Total Evaluation Time   PT Eval, Low Complexity Minutes (51416) 10   Therapy Certification   Start of care date 12/23/24   Certification date from 12/23/24   Certification date to 12/30/24   Physical Therapy Goals   PT Frequency Daily   PT Predicted Duration/Target Date for Goal Attainment 12/30/24   PT Goals Transfers;Gait;PT Goal 1   PT: Transfers Modified independent;Sit to/from stand   PT: Gait Modified independent;Rolling walker;150 feet   PT: Goal 1 Indep with HEP   Interventions   Interventions Quick Adds Gait Training;Therapeutic Procedure   Therapeutic Procedure/Exercise   Ther. Procedure: strength, endurance, ROM, flexibillity Minutes (69947) 9   Symptoms Noted During/After Treatment fatigue   Treatment Detail/Skilled Intervention Sitting HEP x 10, instructed in use of incentive spriometer and educated on pursed lip breathing   Gait Training   Gait Training Minutes (97878) 14   Symptoms Noted During/After Treatment (Gait Training) fatigue   Treatment Detail/Skilled Intervention slow stable gait, vc to push up from recliner chair with STS . STS with SBA .   Patient fatigued quickly on first ambulation , but then tolerated more 2nd time.  VC for use of walker.  Patient has walkers at home but at baseline uses cane.  On RA and able to maintain 92% or better on oximeter   Distance in Feet 50,80   [place] Level (Gait Training) stand-by assist   Physical Assistance Level (Gait Training) verbal cues;supervision;1 person assist   Weight Bearing (Gait Training) full weight-bearing   Assistive Device (Gait Training) rolling walker   Pattern Analysis (Gait Training) swing-through gait   Gait Analysis Deviations decreased yg;decreased step length;decreased stride length   Impairments (Gait Analysis/Training) strength decreased   PT Discharge Planning   PT Plan progress with gait/transfers, HEP motivated to return  to home   PT Discharge Recommendation (DC Rec) home with home care physical therapy   PT Rationale for DC Rec Patient wanting to return to home, has good home setup and moving relatively well considering has been sick for past 2 weeks   PT Brief overview of current status Paitent SBA for 80' with use of FWW, STS SBA   PT Equipment Needed at Discharge walker, rolling   Physical Therapy Time and Intention   Timed Code Treatment Minutes 23   Total Session Time (sum of timed and untimed services) 33   Discharge Needs Assessment (IRF)   Transportation Concerns none

## 2024-12-23 NOTE — PLAN OF CARE
A/O. VSS on 1L NC. Denies pain. Dilt infusing at 5mg/hr. Tele: Afib, rate controlled with PVCs and pauses.   Contact precaution for MRSA initiated.  Call light within reach. Able to make needs known. Will continue to monitor.    Goal Outcome Evaluation:    Problem: Adult Inpatient Plan of Care  Goal: Absence of Hospital-Acquired Illness or Injury  Intervention: Prevent Infection  Recent Flowsheet Documentation  Taken 12/22/2024 2710 by Chuyita Baptiste, RN  Infection Prevention:   cohorting utilized   hand hygiene promoted   rest/sleep promoted   single patient room provided     Problem: Adult Inpatient Plan of Care  Goal: Optimal Comfort and Wellbeing  Outcome: Progressing

## 2024-12-23 NOTE — PLAN OF CARE
Goal Outcome Evaluation:    Problem: Dysrhythmia  Goal: Normalized Cardiac Rhythm  Outcome: Not Progressing     A-fib, HR  - pt unaware when she is in a-fib.  Dilt gtt at 5 mg/hr.  Short of breath, non-productive cough, on 4L o2 via NC.  1-2 assist with cane d/t weakness.

## 2024-12-24 ENCOUNTER — APPOINTMENT (OUTPATIENT)
Dept: PHYSICAL THERAPY | Facility: CLINIC | Age: 89
DRG: 308 | End: 2024-12-24
Attending: INTERNAL MEDICINE
Payer: COMMERCIAL

## 2024-12-24 LAB
ANION GAP SERPL CALCULATED.3IONS-SCNC: 9 MMOL/L (ref 7–15)
BASOPHILS # BLD AUTO: 0.1 10E3/UL (ref 0–0.2)
BASOPHILS NFR BLD AUTO: 1 %
BUN SERPL-MCNC: 21.3 MG/DL (ref 8–23)
CALCIUM SERPL-MCNC: 8.7 MG/DL (ref 8.8–10.4)
CHLORIDE SERPL-SCNC: 104 MMOL/L (ref 98–107)
CREAT SERPL-MCNC: 1.09 MG/DL (ref 0.51–0.95)
EGFRCR SERPLBLD CKD-EPI 2021: 48 ML/MIN/1.73M2
EOSINOPHIL # BLD AUTO: 0.3 10E3/UL (ref 0–0.7)
EOSINOPHIL NFR BLD AUTO: 5 %
ERYTHROCYTE [DISTWIDTH] IN BLOOD BY AUTOMATED COUNT: 16 % (ref 10–15)
GLUCOSE SERPL-MCNC: 108 MG/DL (ref 70–99)
HCO3 SERPL-SCNC: 24 MMOL/L (ref 22–29)
HCT VFR BLD AUTO: 26.9 % (ref 35–47)
HGB BLD-MCNC: 8.3 G/DL (ref 11.7–15.7)
IMM GRANULOCYTES # BLD: 0 10E3/UL
IMM GRANULOCYTES NFR BLD: 0 %
LYMPHOCYTES # BLD AUTO: 1.4 10E3/UL (ref 0.8–5.3)
LYMPHOCYTES NFR BLD AUTO: 23 %
MCH RBC QN AUTO: 24 PG (ref 26.5–33)
MCHC RBC AUTO-ENTMCNC: 30.9 G/DL (ref 31.5–36.5)
MCV RBC AUTO: 78 FL (ref 78–100)
MONOCYTES # BLD AUTO: 1.1 10E3/UL (ref 0–1.3)
MONOCYTES NFR BLD AUTO: 19 %
NEUTROPHILS # BLD AUTO: 3.2 10E3/UL (ref 1.6–8.3)
NEUTROPHILS NFR BLD AUTO: 52 %
NRBC # BLD AUTO: 0 10E3/UL
NRBC BLD AUTO-RTO: 0 /100
PLATELET # BLD AUTO: 147 10E3/UL (ref 150–450)
POTASSIUM SERPL-SCNC: 3.8 MMOL/L (ref 3.4–5.3)
RBC # BLD AUTO: 3.46 10E6/UL (ref 3.8–5.2)
SODIUM SERPL-SCNC: 137 MMOL/L (ref 135–145)
WBC # BLD AUTO: 6.1 10E3/UL (ref 4–11)

## 2024-12-24 PROCEDURE — 36415 COLL VENOUS BLD VENIPUNCTURE: CPT | Performed by: INTERNAL MEDICINE

## 2024-12-24 PROCEDURE — 97110 THERAPEUTIC EXERCISES: CPT | Mod: GP

## 2024-12-24 PROCEDURE — 85004 AUTOMATED DIFF WBC COUNT: CPT | Performed by: INTERNAL MEDICINE

## 2024-12-24 PROCEDURE — 250N000011 HC RX IP 250 OP 636: Performed by: INTERNAL MEDICINE

## 2024-12-24 PROCEDURE — 99232 SBSQ HOSP IP/OBS MODERATE 35: CPT | Mod: GC | Performed by: INTERNAL MEDICINE

## 2024-12-24 PROCEDURE — 97116 GAIT TRAINING THERAPY: CPT | Mod: GP

## 2024-12-24 PROCEDURE — 80048 BASIC METABOLIC PNL TOTAL CA: CPT | Performed by: INTERNAL MEDICINE

## 2024-12-24 PROCEDURE — 250N000013 HC RX MED GY IP 250 OP 250 PS 637

## 2024-12-24 PROCEDURE — 120N000004 HC R&B MS OVERFLOW

## 2024-12-24 PROCEDURE — 99222 1ST HOSP IP/OBS MODERATE 55: CPT | Performed by: INTERNAL MEDICINE

## 2024-12-24 PROCEDURE — 250N000013 HC RX MED GY IP 250 OP 250 PS 637: Performed by: INTERNAL MEDICINE

## 2024-12-24 RX ORDER — METOPROLOL TARTRATE 100 MG/1
100 TABLET ORAL 2 TIMES DAILY
Status: DISCONTINUED | OUTPATIENT
Start: 2024-12-24 | End: 2024-12-25 | Stop reason: HOSPADM

## 2024-12-24 RX ORDER — CEFEPIME HYDROCHLORIDE 2 G/1
2 INJECTION, POWDER, FOR SOLUTION INTRAVENOUS EVERY 24 HOURS
Status: DISCONTINUED | OUTPATIENT
Start: 2024-12-25 | End: 2024-12-24

## 2024-12-24 RX ADMIN — BRIMONIDINE TARTRATE 1 DROP: 2 SOLUTION OPHTHALMIC at 20:11

## 2024-12-24 RX ADMIN — BIMATOPROST 1 DROP: 0.1 SOLUTION/ DROPS OPHTHALMIC at 20:11

## 2024-12-24 RX ADMIN — TIMOLOL MALEATE 1 DROP: 5 SOLUTION OPHTHALMIC at 09:05

## 2024-12-24 RX ADMIN — METOPROLOL TARTRATE 100 MG: 100 TABLET, FILM COATED ORAL at 20:10

## 2024-12-24 RX ADMIN — APIXABAN 2.5 MG: 2.5 TABLET, FILM COATED ORAL at 09:04

## 2024-12-24 RX ADMIN — BRIMONIDINE TARTRATE 1 DROP: 2 SOLUTION OPHTHALMIC at 09:05

## 2024-12-24 RX ADMIN — FUROSEMIDE 20 MG: 20 TABLET ORAL at 09:04

## 2024-12-24 RX ADMIN — APIXABAN 2.5 MG: 2.5 TABLET, FILM COATED ORAL at 20:10

## 2024-12-24 RX ADMIN — BENZOCAINE AND MENTHOL 1 LOZENGE: 15; 3.6 LOZENGE ORAL at 16:45

## 2024-12-24 RX ADMIN — METOPROLOL TARTRATE 50 MG: 50 TABLET, FILM COATED ORAL at 09:04

## 2024-12-24 RX ADMIN — CEFEPIME 2 G: 2 INJECTION, POWDER, FOR SOLUTION INTRAVENOUS at 04:34

## 2024-12-24 ASSESSMENT — ACTIVITIES OF DAILY LIVING (ADL)
ADLS_ACUITY_SCORE: 37

## 2024-12-24 NOTE — PLAN OF CARE
Goal Outcome Evaluation:    Note from [id number]. VSS on RA. Denies shortness of breath. A&Ox4. Afib w/PVCs on tele. Denies pain. No new skin issues noted. Full sensation per pt. Assist of 1 with walker/gait belt for transferring. Right PIV SL. Education of medication administration and use of call-light to reduce risk for falls and injury. No further issues noted.     Problem: Adult Inpatient Plan of Care  Goal: Optimal Comfort and Wellbeing  Outcome: Progressing     Problem: Skin Injury Risk Increased  Goal: Skin Health and Integrity  Outcome: Progressing     Problem: Adult Inpatient Plan of Care  Goal: Readiness for Transition of Care  Outcome: Progressing

## 2024-12-24 NOTE — PLAN OF CARE
Goal Outcome Evaluation:       A&O, VSS, Telemetry is afib with frequent PVC's. Discontinued Dilt drip today, HR is maintaining 70's-90's tolerating well. Up with standby assistance with cane. Denies pain.                 Problem: Adult Inpatient Plan of Care  Goal: Absence of Hospital-Acquired Illness or Injury  Outcome: Progressing  Intervention: Identify and Manage Fall Risk  Recent Flowsheet Documentation  Taken 12/23/2024 1230 by Danielle Marrufo RN  Safety Promotion/Fall Prevention: safety round/check completed  Taken 12/23/2024 0830 by Danielle Marrufo RN  Safety Promotion/Fall Prevention: safety round/check completed  Intervention: Prevent Skin Injury  Recent Flowsheet Documentation  Taken 12/23/2024 1230 by Danielle Marrufo RN  Body Position: position changed independently  Skin Protection: adhesive use limited  Taken 12/23/2024 0830 by Danielle Marrufo RN  Body Position: position changed independently  Skin Protection: adhesive use limited  Intervention: Prevent Infection  Recent Flowsheet Documentation  Taken 12/23/2024 1230 by Danielle Marrufo RN  Infection Prevention:   cohorting utilized   hand hygiene promoted   rest/sleep promoted   single patient room provided  Taken 12/23/2024 0830 by Danielle Marrufo RN  Infection Prevention:   cohorting utilized   hand hygiene promoted   rest/sleep promoted   single patient room provided     Problem: Dysrhythmia  Goal: Normalized Cardiac Rhythm  Outcome: Progressing

## 2024-12-24 NOTE — PLAN OF CARE
Neuro: Pt is alert and oriented. Endorses poor sleep overnight, so is feeling tired today.  CV: HR was A fib RVR in the AM, rates 100-110s. In the afternoon rate controlled in 80-90s.   Resp: Pt on room air. No complaints of SOB or CORONADO. Lung sounds clear, but diminished.   GI: Bowel sounds active, tolerating cardiac diet.   : Pt urinating without issue in the bathroom.  Pain: Pt denies pain.  Activity: Up with a SBA with her cane.     Ainsley Robin RN      Goal Outcome Evaluation:      Plan of Care Reviewed With: patient    Overall Patient Progress: improvingOverall Patient Progress: improving

## 2024-12-24 NOTE — PROGRESS NOTES
[facility]    Medicine Progress Note - Hospitalist Service    Date of Admission:  12/22/2024    Assessment & Plan  Silvia Rodriguez is a 89 year old female admitted on 12/22/2024. She has a history of  and is admitted for atrial fibrillation with RVR with acute on chronic HFpEF exacerbation.         #Atrial fibrillation with RVR  #Possible acute on chronic heart failure with preserved ejection fraction  # Moderate pulmonary hypertension  # Concern for pulmonary edema secondary to above  ## Bilateral pleural effusion 2/2 to above   # Acute cough secondary to above  # Lymphadenopathy of the thoracic lymph nodes  # History of sarcoidosis   CT did show lymphadenopathy of the thoracic lymph nodes that appear to be larger.  Based on her chart she does have a remote history of sarcoidosis.  It is unclear,  if these enlarged lymph nodes are due to sarcoidosis or other processes infection.  Of the chest ruled out pulmonary embolism however, her presentation is consistent with acute on chronic heart failure exacerbated by A-fib with RVR. The left ventricle is normal in size with normal left ventricular wall thickness.  Echo did show ejection fraction 50 to 55% with moderate. pulmonary hypertension.  Net volume of negative 2.4 L.  Pleural fluid analysis suggests transudative effusion.  Most likely due to her heart failure.    -Pulmonology consult appreciate recs  -Continue apixaban  -increase metoprolol tartrate to 100 mg twice daily   -Strict I's and O's  -Daily weights  -Continue Lasix 20 mg oral daily      #Concern for hospital acquired pneumonia   Pleural effusion analysis consistent with transudative effusion which is less likely due to an infectious process.  Will discontinue antibiotics.   -Discontinue cefepime  -Discontinue Vanco  -guaifenesin as needed        # Microcytic anemia  Hgb usually between 8.7-10.1. Iron sat is 6% and ferritin 15 from previous hospital stay in [facility] indicate iron  deficiency anemia.  Will continue to monitor. Hemoglobin at 8.3 today.   -Consider oral iron as outpatient.    -CBC daily     # CKD stage III  Kidney function stable for now.  The Cystatin C level computes EGFR of 50 mL/minute.  Vancomycin is probably not helping her kidney function.  -Continue Lasix       Chronic medications:  Keratoconjunctivitis sicca: Continue PTA timolol and brimonidine eyedrops           Diet: Regular Diet Adult    DVT Prophylaxis: DOAC  Denson Catheter: Not present  Lines: None     Cardiac Monitoring: ACTIVE order. Indication: Tachyarrhythmias, acute (48 hours)  Code Status: No CPR- Do NOT Intubate      Clinically Significant Risk Factors               # Hypoalbuminemia: Lowest albumin = 3.4 g/dL at 12/22/2024  1:03 PM, will monitor as appropriate    # Coagulation Defect: INR = 1.39 (Ref range: 0.85 - 1.15) and/or PTT = N/A, will monitor for bleeding                   # Financial/Environmental Concerns: none         Social Drivers of Health              Disposition Plan     Medically Ready for Discharge: anticipate discharge tomorrow if rate is controlled on orals.     Patient is from [facility] nursing home.  Plan is to return there.     PT/OT: Home care with PT    The patient's care was discussed with the Attending Physician, Dr. Gayle .    Tricia Bedolla MD  Hospitalist Service  [facility]  Securely message with Traxer (more info)  Text page via New Screens Paging/Directory   ______________________________________________________________________    Interval History   No overnight events.  Patient's denies any pain.  Patient has been off room air since yesterday afternoon with no problems.          Physical Exam   Vital Signs: Temp: 97.9  F (36.6  C) Temp src: Oral BP: 115/55 Pulse: 96   Resp: 18 SpO2: 90 % O2 Device: None (Room air) Oxygen Delivery: 1 LPM  Weight: 117 lbs 1.6 oz  Urine output: 850, net -2.4 L  Tele: Atrial fibrillation with PVCs, rate  controlled    Constitutional: Patient appears fatigued but not in acute distress  HENT: Normocephalic, Atraumatic  Respiratory: Mostly clear to auscultation.  No wheezing or rhonchi.  Crackles were heard in the bases of the lungs.  Cardiovascular: Irregularly irregular rhythm, little tachy.   GI:  Soft, No tenderness, No masses, No flank tenderness. No rebound or guarding.  : No cva tenderness    Musculoskeletal: 2+ DP pulses. No edema. No cyanosis. Good range of motion in all major joints. No tenderness to palpation. No tenderness of the CTLS spine.   Integument: Warm, Dry, No erythema, No rash. No petechiae.   Neurologic: Alert & oriented x 3, Normal motor function, Normal sensory function, No focal deficits noted.   Psychiatric: Affect normal, Judgment normal, Mood normal. Cooperative.    Medical Decision Making             Data   Imaging results reviewed over the past 24 hrs:   Recent Results (from the past 24 hours)   Echocardiogram Complete   Result Value    LVEF  50-55% (borderline)    Narrative    [id number]  [id number]  [id number]  [id number]^KYREE^[place] [address]     Name: DANIELLE ROBLES  MRN: [id number]  : 1933  Study Date: 2024 01:14 PM  Age: 89 yrs  Gender: Female  Patient Location: [facility]  Reason For Study: Heart Failure  Ordering Physician: ITZEL ADORNO  Performed By: JIM     BSA: 1.6 m2  Height: 67 in  Weight: 117 lb  HR: 57  BP: 117/55 mmHg  ______________________________________________________________________________  Procedure  Echocardiogram with two-dimensional, color and spectral Doppler. Definity (NDC  #[id number]) given intravenously.  ______________________________________________________________________________  Interpretation Summary     The left ventricle is normal in size with normal left ventricular wall  thickness.  Left ventricular function is decreased. The ejection fraction is 50-55%  (mildly reduced). There is mild  global hypokinesia of the left ventricle.  The right ventricle is mildly dilated with mildly decreased right ventricular  systolic function  Both atria are moderately dilated.  The mitral and tricuspid valves both have moderate regurgitation.  Right ventricular systolic pressure is elevated (48 mmHg), consistent with  moderate pulmonary hypertension.  ______________________________________________________________________________  Left Ventricle  The left ventricle is normal in size. Left ventricular function is decreased.  The ejection fraction is 50-55% (borderline). There is normal left ventricular  wall thickness. Diastolic function not assessed due to atrial fibrillation.  There is mild global hypokinesia of the left ventricle.     Right Ventricle  The right ventricle is mildly dilated. Mildly decreased right ventricular  systolic function.     Atria  The left atrium is moderately dilated. The right atrium is moderately dilated.  There is no color Doppler evidence of an atrial shunt.     Mitral Valve  There is mild mitral annular calcification. There is moderate (2+) mitral  regurgitation.     Tricuspid Valve  Tricuspid valve leaflets appear normal. The right ventricular systolic  pressure is approximated at 40.0 mmHg plus the right atrial pressure. There is  moderate (2+) tricuspid regurgitation. Right ventricular systolic pressure is  elevated, consistent with moderate pulmonary hypertension.     Aortic Valve  The aortic valve is trileaflet. Aortic valve leaflets appear normal. There is  no evidence of aortic stenosis or clinically significant aortic regurgitation.     Pulmonic Valve  The pulmonic valve is not well seen, but is grossly normal. This degree of  valvular regurgitation is within normal limits. There is trace pulmonic  valvular regurgitation.     Vessels  The aorta root is normal. Normal size ascending aorta. IVC diameter and  respiratory changes fall into an intermediate range suggesting an RA  pressure  of 8 mmHg.     Pericardium  There is no pericardial effusion.     Rhythm  The rhythm was atrial fibrillation.  ______________________________________________________________________________  MMode/2D Measurements & Calculations     IVSd: 0.83 cm  LVIDd: 4.0 cm  LVIDs: 3.6 cm  LVPWd: 1.1 cm  FS: 10.3 %  LV mass(C)d: 122.0 grams  LV mass(C)dI: 75.8 grams/m2  Ao root diam: 3.2 cm  LA dimension: 4.2 cm  asc Aorta Diam: 3.2 cm  LA/Ao: 1.3  LVOT diam: 2.0 cm  LVOT area: 3.2 cm2  Ao root diam index Ht(cm/m): 1.9  Ao root diam index BSA (cm/m2): 2.0  Asc Ao diam index BSA (cm/m2): 2.0  Asc Ao diam index Ht(cm/m): 1.9  EF Biplane: 55.8 %  LA Volume (BP): 88.6 ml     LA Volume Index (BP): 55.0 ml/m2  LA Volume Indexed (AL/bp): 59.8 ml/m2  RV Base: 4.0 cm  RWT: 0.54  TAPSE: 1.8 cm     Time Measurements  MM HR: 57.0 BPM     Doppler Measurements & Calculations  MV E max charli: 126.7 cm/sec  MV max P.1 mmHg  MV mean P.9 mmHg  MV V2 VTI: 26.5 cm  MVA(VTI): 1.5 cm2  MV dec time: 0.17 sec  Ao V2 max: 109.9 cm/sec  Ao max P.0 mmHg  Ao V2 mean: 71.0 cm/sec  Ao mean P.1 mmHg  Ao V2 VTI: 23.0 cm  JULITA(I,D): 1.7 cm2  JULITA(V,D): 1.8 cm2  LV V1 max P.5 mmHg  LV V1 max: 61.3 cm/sec  LV V1 VTI: 12.5 cm  MR PISA: 1.3 cm2  MR ERO: 0.07 cm2  MR volume: 11.9 ml  SV(LVOT): 39.9 ml  SI(LVOT): 24.8 ml/m2  PA V2 max: 72.7 cm/sec  PA max P.1 mmHg  PA acc time: 0.06 sec  PI end-d charli: 78.5 cm/sec  TR max charli: 316.1 cm/sec  TR max P.0 mmHg  AV Charli Ratio (DI): 0.56  JULITA Index (cm2/m2): 1.1     E/E': 23.9  E/E' av.0  Lateral E/e': 14.2  Medial E/e': 23.8  Peak E' Charli: 5.3 cm/sec  RV S Charli: 9.1 cm/sec     ______________________________________________________________________________  Report approved by: Son Mitchell MD on 2024 03:25 PM         US Thoracentesis    Narrative    EXAM:   1. RIGHT THORACENTESIS  2. ULTRASOUND GUIDANCE  LOCATION: [facility]  DATE:  12/23/2024    INDICATION: Pleural effusion.    PROCEDURE: Informed consent obtained. Time out performed. The chest was prepped and draped in sterile fashion. 8 mL of 1 % lidocaine was infused into the local soft tissues. Under direct ultrasound guidance, a 5 Arabic catheter system was placed into the   pleural effusion.     0.7 liters of clear yellow fluid were removed and sent to lab, if requested.    Patient tolerated procedure well.    Ultrasound imaging was obtained and placed in the patient's permanent medical record.      Impression    IMPRESSION:  Status post right ultrasound-guided thoracentesis.    Reference CPT Code: 31959

## 2024-12-25 VITALS
HEIGHT: 67 IN | OXYGEN SATURATION: 95 % | TEMPERATURE: 97.5 F | WEIGHT: 121.25 LBS | RESPIRATION RATE: 18 BRPM | DIASTOLIC BLOOD PRESSURE: 70 MMHG | BODY MASS INDEX: 19.03 KG/M2 | SYSTOLIC BLOOD PRESSURE: 132 MMHG | HEART RATE: 100 BPM

## 2024-12-25 LAB
ANION GAP SERPL CALCULATED.3IONS-SCNC: 9 MMOL/L (ref 7–15)
BACTERIA SPEC CULT: ABNORMAL
BASOPHILS # BLD AUTO: 0 10E3/UL (ref 0–0.2)
BASOPHILS NFR BLD AUTO: 0 %
BUN SERPL-MCNC: 20.5 MG/DL (ref 8–23)
CALCIUM SERPL-MCNC: 8.8 MG/DL (ref 8.8–10.4)
CHLORIDE SERPL-SCNC: 102 MMOL/L (ref 98–107)
CREAT SERPL-MCNC: 1.06 MG/DL (ref 0.51–0.95)
EGFRCR SERPLBLD CKD-EPI 2021: 50 ML/MIN/1.73M2
EOSINOPHIL # BLD AUTO: 0.4 10E3/UL (ref 0–0.7)
EOSINOPHIL NFR BLD AUTO: 6 %
ERYTHROCYTE [DISTWIDTH] IN BLOOD BY AUTOMATED COUNT: 15.9 % (ref 10–15)
GLUCOSE SERPL-MCNC: 116 MG/DL (ref 70–99)
HCO3 SERPL-SCNC: 26 MMOL/L (ref 22–29)
HCT VFR BLD AUTO: 26.8 % (ref 35–47)
HGB BLD-MCNC: 8.5 G/DL (ref 11.7–15.7)
IMM GRANULOCYTES # BLD: 0 10E3/UL
IMM GRANULOCYTES NFR BLD: 0 %
LYMPHOCYTES # BLD AUTO: 1.5 10E3/UL (ref 0.8–5.3)
LYMPHOCYTES NFR BLD AUTO: 22 %
MCH RBC QN AUTO: 24.3 PG (ref 26.5–33)
MCHC RBC AUTO-ENTMCNC: 31.7 G/DL (ref 31.5–36.5)
MCV RBC AUTO: 77 FL (ref 78–100)
MONOCYTES # BLD AUTO: 1.2 10E3/UL (ref 0–1.3)
MONOCYTES NFR BLD AUTO: 17 %
NEUTROPHILS # BLD AUTO: 3.7 10E3/UL (ref 1.6–8.3)
NEUTROPHILS NFR BLD AUTO: 55 %
NRBC # BLD AUTO: 0 10E3/UL
NRBC BLD AUTO-RTO: 0 /100
PLATELET # BLD AUTO: 148 10E3/UL (ref 150–450)
PLATELET # BLD AUTO: 153 10E3/UL (ref 150–450)
POTASSIUM SERPL-SCNC: 3.7 MMOL/L (ref 3.4–5.3)
RBC # BLD AUTO: 3.5 10E6/UL (ref 3.8–5.2)
SODIUM SERPL-SCNC: 137 MMOL/L (ref 135–145)
WBC # BLD AUTO: 6.7 10E3/UL (ref 4–11)

## 2024-12-25 PROCEDURE — 85004 AUTOMATED DIFF WBC COUNT: CPT | Performed by: INTERNAL MEDICINE

## 2024-12-25 PROCEDURE — 36415 COLL VENOUS BLD VENIPUNCTURE: CPT | Performed by: INTERNAL MEDICINE

## 2024-12-25 PROCEDURE — 85049 AUTOMATED PLATELET COUNT: CPT | Performed by: INTERNAL MEDICINE

## 2024-12-25 PROCEDURE — 99238 HOSP IP/OBS DSCHRG MGMT 30/<: CPT | Mod: GC

## 2024-12-25 PROCEDURE — 250N000013 HC RX MED GY IP 250 OP 250 PS 637

## 2024-12-25 PROCEDURE — 82565 ASSAY OF CREATININE: CPT | Performed by: INTERNAL MEDICINE

## 2024-12-25 PROCEDURE — 80048 BASIC METABOLIC PNL TOTAL CA: CPT | Performed by: INTERNAL MEDICINE

## 2024-12-25 RX ORDER — METOPROLOL TARTRATE 100 MG/1
100 TABLET ORAL 2 TIMES DAILY
Qty: 60 TABLET | Refills: 0 | Status: SHIPPED | OUTPATIENT
Start: 2024-12-25 | End: 2025-01-24

## 2024-12-25 RX ORDER — FUROSEMIDE 20 MG/1
20 TABLET ORAL DAILY
Qty: 30 TABLET | Refills: 0 | Status: SHIPPED | OUTPATIENT
Start: 2024-12-25 | End: 2025-01-24

## 2024-12-25 RX ADMIN — BRIMONIDINE TARTRATE 1 DROP: 2 SOLUTION OPHTHALMIC at 08:31

## 2024-12-25 RX ADMIN — TIMOLOL MALEATE 1 DROP: 5 SOLUTION OPHTHALMIC at 08:31

## 2024-12-25 RX ADMIN — APIXABAN 2.5 MG: 2.5 TABLET, FILM COATED ORAL at 08:32

## 2024-12-25 ASSESSMENT — ACTIVITIES OF DAILY LIVING (ADL)
ADLS_ACUITY_SCORE: 37
ADLS_ACUITY_SCORE: 41
ADLS_ACUITY_SCORE: 37
ADLS_ACUITY_SCORE: 41
ADLS_ACUITY_SCORE: 41
ADLS_ACUITY_SCORE: 37
ADLS_ACUITY_SCORE: 41
ADLS_ACUITY_SCORE: 41

## 2024-12-25 NOTE — PROGRESS NOTES
PULMONARY SIGNOFF NOTE:  Pulmonary will sign off this patient, please feel free to contact us with any questions.    Tila Dunlap MD  Pulmonary and Critical Care  [facility]

## 2024-12-25 NOTE — DISCHARGE SUMMARY
[facility]  Discharge Summary - Medicine & Pediatrics       Date of Admission:  12/22/2024  Date of Discharge:  12/25/2024  Discharging Provider: Chapincito Gayle MD; Reji Garcia MD   Discharge Service: Hospitalist Service    Discharge Diagnoses   Pulmonary edema  Heart failure exacerbation  A-fib with RVR  Clinically Significant Risk Factors          Follow-ups Needed After Discharge   Follow-up Appointments       Follow-up and recommended labs and tests       Follow up with primary care provider, Alaina Albarran, within 7 days for hospital follow- up.  The following labs/tests are recommended: BMP.                Unresulted Labs Ordered in the Past 30 Days of this Admission       Date and Time Order Name Status Description    12/23/2024 12:31 PM Adenosine Deaminase Pleural Fluid In process     12/23/2024 12:08 PM Pleural fluid Aerobic Bacterial Culture Routine With Gram Stain Preliminary     12/22/2024 11:49 PM MRSA Culture Reflex Preliminary         These results will be followed up by PCP    Discharge Disposition   Discharged to independent living facility  Condition at discharge: Stable    Hospital Course   Silvia Rodriguez was admitted on 12/22/2024 for A-fib with RVR, acute on chronic heart failure.  The following problems were addressed during her hospitalization:    #Atrial fibrillation with RVR: Stable  #Possible acute on chronic heart failure with preserved ejection fraction  # Moderate pulmonary hypertension  # Concern for pulmonary edema secondary to above  ## Bilateral pleural effusion 2/2 to above   # Lymphadenopathy of the thoracic lymph nodes  # History of sarcoidosis  This is a 89-year-old female who presented on 12/22/2024 for shortness of breath found to be in A-fib with RVR.  Medical history is pertinent for recent community-acquired pneumonia treated with doxycycline in November and A-fib with RVR requiring hospitalization at [facility] from 12/17-12/19.  At that admission,  her echocardiogram showed an EF 50 to 55% with mild to moderate mitral regurgitation and mild pulmonary hypertension.  Since that discharge, patient reported no improvement of her symptoms.  CT on admission was concerning for bilateral pneumonia versus CHF.  Also showed lymphadenopathy of the thoracic lymph nodes that appear to be larger and on our chart review, she has a remote history of sarcoidosis.  Patient ultimately was treated for A-fib with RVR and heart failure.  She did require a dill drip the first night of admission but then transition to oral metoprolol 100 mg twice daily with good control of her rates.  Repeat echocardiogram this admission showed heart failure with mildly reduced ejection fraction with an EF of 50 to 55% and pulmonary hypertension.  Pulmonology was consulted for a pleurocentesis and fluid results showed a transudative effusion.  She received diuresis during this hospitalization and had her metoprolol increased to 100 mg twice daily with improvement of her overall symptoms.  At discharge, her rates were controlled and breathing improved.  Recommend the following:   -Continue apixaban  -increased metoprolol tartrate to 100 mg twice daily   -Daily weights at home  -Continue Lasix 20 mg oral daily      #Concern for hospital acquired pneumonia- resolved   There was some concern of hospital-acquired pneumonia on admission given her presentation, however pleural effusion analysis consistent with transudative effusion which was less likely due to an infectious process.  As such, antibiotics were discontinued.        # Microcytic anemia- stable   Hgb usually between 8.7-10.1. Iron sat is 6% and ferritin 15 from previous hospital stay in [facility] indicate iron deficiency anemia.  Will continue to monitor. Hemoglobin stable.   -Consider oral iron as outpatient.       Consultations This Hospital Stay   PHARMACY TO DOSE VANCO  PHARMACY TO DOSE VANCO  CARE MANAGEMENT / SOCIAL WORK IP CONSULT  PHYSICAL  THERAPY ADULT IP CONSULT  PULMONARY IP CONSULT  CARE MANAGEMENT / SOCIAL WORK IP CONSULT  INTERVENTIONAL RADIOLOGY ADULT/PEDS IP CONSULT    Code Status   No CPR- Do NOT Intubate       The patient was discussed with Dr. Chapincito Garcia MD  [place] Team Service  [facility]  [address] [address]  Phone: [phone]  Fax: [phone]  ______________________________________________________________________    Physical Exam   Vital Signs: Temp: 97.5  F (36.4  C) Temp src: Oral BP: (!) 86/68 Pulse: 96   Resp: 18 SpO2: 96 % O2 Device: None (Room air)    Weight: 121 lbs 4.05 oz  Constitutional: Patient appears fatigued but not in acute distress  HENT: Normocephalic, Atraumatic  Respiratory: Overall clear lungs to auscultation posterior lung fields  Cardiovascular: Irregularly irregular; regular rate  GI:  Soft, No tenderness, No masses, No flank tenderness. No rebound or guarding.  : No cva tenderness    Musculoskeletal: 2+ DP pulses. No edema. No cyanosis. Good range of motion in all major joints. No tenderness to palpation. No tenderness of the CTLS spine.   Integument: Warm, Dry, No erythema, No rash. No petechiae.   Neurologic: Alert & oriented x 3, Normal motor function, Normal sensory function, No focal deficits noted.   Psychiatric: Affect normal, Judgment normal, Mood normal. Cooperative.      Primary Care Physician   Alaina Albarran    Discharge Orders      Home Care Referral      Reason for your hospital stay    You are hospitalized for atrial fibrillation and high heart rates also found to be in heart failure.  You received rate control medications as well as Lasix to get fluid off with improvement of your condition.  You are now safe to be discharged home with close follow-up with your primary care doctor.  You should continue to take metoprolol 100 mg 2 times daily and Lasix 20 mg daily.  Please check your weight daily and record it to  assess your fluid status.     Follow-up and recommended labs and tests     Follow up with primary care provider, Alaina Albarran, within 7 days for hospital follow- up.  The following labs/tests are recommended: BMP.     Activity    Your activity upon discharge: activity as tolerated     Diet    Follow this diet upon discharge: Current Diet:Orders Placed This Encounter      Regular Diet Adult       Significant Results and Procedures   Most Recent 3 CBC's:  Recent Labs   Lab Test 12/25/24  0447 12/25/24  0037 12/24/24  0520 12/23/24  0602   WBC 6.7  --  6.1 6.6   HGB 8.5*  --  8.3* 8.7*   MCV 77*  --  78 78   * 153 147* 157     Most Recent 3 BMP's:  Recent Labs   Lab Test 12/25/24  0447 12/24/24  0520 12/23/24  0602    137 138   POTASSIUM 3.7 3.8 3.7   CHLORIDE 102 104 102   CO2 26 24 26   BUN 20.5 21.3 20.2   CR 1.06* 1.09* 1.08*   ANIONGAP 9 9 10   STEPHANIE 8.8 8.7* 9.0   * 108* 109*   ,   Results for orders placed or performed during the hospital encounter of 12/22/24   XR Chest Port 1 View    Narrative    EXAM: XR CHEST PORT 1 VIEW  LOCATION: [facility]  DATE: 12/22/2024    INDICATION: anterior chest pain  COMPARISON: 12/19/2024.      Impression    IMPRESSION: Cardiac silhouette is within normal limits for size. Calcifications of the thoracic aorta. No significant change and patchy opacities of the right upper lung and bilateral lung bases. Probable small bilateral pleural effusions are unchanged.   No discernible pneumothorax.   CT Chest w Contrast    Narrative    EXAM: CT CHEST WITH CONTRAST  LOCATION: [facility]  DATE: 12/22/2024    INDICATION: Shortness of breath, hypoxia.  COMPARISON: Chest x-ray 12/22/2024. CT 09/18/2013.  TECHNIQUE: CT chest with IV contrast. Multiplanar reformats were obtained. Dose reduction techniques were used.    CONTRAST: Isovue 370, 75 mL.    FINDINGS:   LUNGS AND PLEURA: Moderate bilateral pleural effusions. Bilateral  smooth interstitial prominence. Patchy areas of consolidation noted at the posterior right upper lobe, superior right lower lobe, and periphery of the left upper lobe. Superimposed hazy   groundglass opacities bilaterally.    MEDIASTINUM/AXILLAE: No adenopathy. No visualized pulmonary embolism. No acute thoracic aortic abnormality. Several enlarged lymph nodes seen at the bilateral mediastinum. One example anterior to the right main bronchus is 20 mm series 4 image 63. There   are other examples. Cardiomegaly.    CORONARY ARTERY CALCIFICATION: Minimal.    UPPER ABDOMEN: No acute abnormality.    MUSCULOSKELETAL: Unremarkable.      Impression    IMPRESSION:   1.  Patchy bilateral regions of pulmonary consolidation raising the possibility of bilateral pneumonia.  2.  Moderate bilateral pleural effusions, cardiomegaly, suggestion of pulmonary edema consistent with congestive heart failure.  3.  Several enlarged thoracic lymph nodes appear larger.     US Thoracentesis    Narrative    EXAM:   1. RIGHT THORACENTESIS  2. ULTRASOUND GUIDANCE  LOCATION: [facility]  DATE: 12/23/2024    INDICATION: Pleural effusion.    PROCEDURE: Informed consent obtained. Time out performed. The chest was prepped and draped in sterile fashion. 8 mL of 1 % lidocaine was infused into the local soft tissues. Under direct ultrasound guidance, a 5 [place]ese catheter system was placed into the   pleural effusion.     0.7 liters of clear yellow fluid were removed and sent to lab, if requested.    Patient tolerated procedure well.    Ultrasound imaging was obtained and placed in the patient's permanent medical record.      Impression    IMPRESSION:  Status post right ultrasound-guided thoracentesis.    Reference CPT Code: 62999   Echocardiogram Complete     Value    LVEF  50-55% (borderline)    Narrative    [id number]  [id number]  [id number]  [id number]^KYREE^[place] [address]      Name: DANIELLE ROBLES  MRN: [id number]  : 1933  Study Date: 2024 01:14 PM  Age: 89 yrs  Gender: Female  Patient Location: [facility]  Reason For Study: Heart Failure  Ordering Physician: ITZEL ADORNO  Performed By: JIM     BSA: 1.6 m2  Height: 67 in  Weight: 117 lb  HR: 57  BP: 117/55 mmHg  ______________________________________________________________________________  Procedure  Echocardiogram with two-dimensional, color and spectral Doppler. Definity (NDC  #26768-024) given intravenously.  ______________________________________________________________________________  Interpretation Summary     The left ventricle is normal in size with normal left ventricular wall  thickness.  Left ventricular function is decreased. The ejection fraction is 50-55%  (mildly reduced). There is mild global hypokinesia of the left ventricle.  The right ventricle is mildly dilated with mildly decreased right ventricular  systolic function  Both atria are moderately dilated.  The mitral and tricuspid valves both have moderate regurgitation.  Right ventricular systolic pressure is elevated (48 mmHg), consistent with  moderate pulmonary hypertension.  ______________________________________________________________________________  Left Ventricle  The left ventricle is normal in size. Left ventricular function is decreased.  The ejection fraction is 50-55% (borderline). There is normal left ventricular  wall thickness. Diastolic function not assessed due to atrial fibrillation.  There is mild global hypokinesia of the left ventricle.     Right Ventricle  The right ventricle is mildly dilated. Mildly decreased right ventricular  systolic function.     Atria  The left atrium is moderately dilated. The right atrium is moderately dilated.  There is no color Doppler evidence of an atrial shunt.     Mitral Valve  There is mild mitral annular calcification. There is moderate (2+) mitral  regurgitation.     Tricuspid Valve  Tricuspid  valve leaflets appear normal. The right ventricular systolic  pressure is approximated at 40.0 mmHg plus the right atrial pressure. There is  moderate (2+) tricuspid regurgitation. Right ventricular systolic pressure is  elevated, consistent with moderate pulmonary hypertension.     Aortic Valve  The aortic valve is trileaflet. Aortic valve leaflets appear normal. There is  no evidence of aortic stenosis or clinically significant aortic regurgitation.     Pulmonic Valve  The pulmonic valve is not well seen, but is grossly normal. This degree of  valvular regurgitation is within normal limits. There is trace pulmonic  valvular regurgitation.     Vessels  The aorta root is normal. Normal size ascending aorta. IVC diameter and  respiratory changes fall into an intermediate range suggesting an RA pressure  of 8 mmHg.     Pericardium  There is no pericardial effusion.     Rhythm  The rhythm was atrial fibrillation.  ______________________________________________________________________________  MMode/2D Measurements & Calculations     IVSd: 0.83 cm  LVIDd: 4.0 cm  LVIDs: 3.6 cm  LVPWd: 1.1 cm  FS: 10.3 %  LV mass(C)d: 122.0 grams  LV mass(C)dI: 75.8 grams/m2  Ao root diam: 3.2 cm  LA dimension: 4.2 cm  asc Aorta Diam: 3.2 cm  LA/Ao: 1.3  LVOT diam: 2.0 cm  LVOT area: 3.2 cm2  Ao root diam index Ht(cm/m): 1.9  Ao root diam index BSA (cm/m2): 2.0  Asc Ao diam index BSA (cm/m2): 2.0  Asc Ao diam index Ht(cm/m): 1.9  EF Biplane: 55.8 %  LA Volume (BP): 88.6 ml     LA Volume Index (BP): 55.0 ml/m2  LA Volume Indexed (AL/bp): 59.8 ml/m2  RV Base: 4.0 cm  RWT: 0.54  TAPSE: 1.8 cm     Time Measurements  MM HR: 57.0 BPM     Doppler Measurements & Calculations  MV E max florian: 126.7 cm/sec  MV max P.1 mmHg  MV mean P.9 mmHg  MV V2 VTI: 26.5 cm  MVA(VTI): 1.5 cm2  MV dec time: 0.17 sec  Ao V2 max: 109.9 cm/sec  Ao max P.0 mmHg  Ao V2 mean: 71.0 cm/sec  Ao mean P.1 mmHg  Ao V2 VTI: 23.0 cm  JULITA(I,D): 1.7 cm2  JULITA(V,D):  1.8 cm2  LV V1 max P.5 mmHg  LV V1 max: 61.3 cm/sec  LV V1 VTI: 12.5 cm  MR PISA: 1.3 cm2  MR ERO: 0.07 cm2  MR volume: 11.9 ml  SV(LVOT): 39.9 ml  SI(LVOT): 24.8 ml/m2  PA V2 max: 72.7 cm/sec  PA max P.1 mmHg  PA acc time: 0.06 sec  PI end-d charli: 78.5 cm/sec  TR max charli: 316.1 cm/sec  TR max P.0 mmHg  AV Charli Ratio (DI): 0.56  JULITA Index (cm2/m2): 1.1     E/E': 23.9  E/E' av.0  Lateral E/e': 14.2  Medial E/e': 23.8  Peak E' Charli: 5.3 cm/sec  RV S Charli: 9.1 cm/sec     ______________________________________________________________________________  Report approved by: Son Mitchell MD on 2024 03:25 PM             Discharge Medications   Current Discharge Medication List        START taking these medications    Details   furosemide (LASIX) 20 MG tablet Take 1 tablet (20 mg) by mouth daily.  Qty: 30 tablet, Refills: 0    Associated Diagnoses: Acute decompensated heart failure (H)           CONTINUE these medications which have CHANGED    Details   metoprolol tartrate (LOPRESSOR) 100 MG tablet Take 1 tablet (100 mg) by mouth 2 times daily.  Qty: 60 tablet, Refills: 0    Associated Diagnoses: Atrial fibrillation with rapid ventricular response (H)           CONTINUE these medications which have NOT CHANGED    Details   apixaban ANTICOAGULANT (ELIQUIS) 2.5 MG tablet Take 2.5 mg by mouth 2 times daily.      bimatoprost (LUMIGAN) 0.01 % SOLN Place 1 drop into both eyes at bedtime.      brimonidine (ALPHAGAN) 0.2 % ophthalmic solution Place 1 drop into the right eye 2 times daily.      timolol maleate (TIMOPTIC) 0.5 % ophthalmic solution Place 1 drop into both eyes every morning.           Allergies   Allergies   Allergen Reactions    Penicillins

## 2024-12-25 NOTE — PROGRESS NOTES
Care Management Discharge Note    Discharge Date: 12/25/2024       Discharge Disposition: Home, Home Care    Discharge Services: Home Care    Discharge DME: None    Discharge Transportation: family    Private pay costs discussed: Not applicable    Does the patient's insurance plan have a 3 day qualifying hospital stay waiver?  No    PAS Confirmation Code:    Patient/family educated on Medicare website which has current facility and service quality ratings: no    Education Provided on the Discharge Plan: Yes  Persons Notified of Discharge Plans: pt, family, home care  Patient/Family in Agreement with the Plan: yes    Handoff Referral Completed: No, handoff not indicated or clinically appropriate    Additional Information:  Pt to discharge back to [facility] with home care services from [facility].     Family is here and will transport pt back to her home.     Neymar Diaz RN

## 2024-12-25 NOTE — PLAN OF CARE
Pt A&Ox4. VSS on RA. Denies pain, SOB, dizziness, lightheadedness, and n/v. Tele a-fib, rate anywhere from 90s-110s, now on 100mg PO metoprolol BID. Regular diet. SBA w/cane. [facility]   Problem: Adult Inpatient Plan of Care  Goal: Optimal Comfort and Wellbeing  Outcome: Progressing     Problem: Skin Injury Risk Increased  Goal: Skin Health and Integrity  Outcome: Progressing  Intervention: Plan: Nurse Driven Intervention: Moisture Management  Recent Flowsheet Documentation  Taken 12/24/2024 2343 by Avelina Weiss, RN  Moisture Interventions: Encourage regular toileting  Taken 12/24/2024 2259 by Avelina Weiss, RN  Bathing/Skin Care: incontinence care  Taken 12/24/2024 2015 by Avelina Weiss, RN  Moisture Interventions: Encourage regular toileting  Intervention: Optimize Skin Protection  Recent Flowsheet Documentation  Taken 12/24/2024 2343 by Avelina Weiss, RN  Activity Management:   activity adjusted per tolerance   activity encouraged  Head of Bed (HOB) Positioning: HOB at 30-45 degrees  Taken 12/24/2024 2259 by Avelina Weiss, RN  Activity Management:   ambulated to bathroom   back to bed  Head of Bed (HOB) Positioning: HOB at 30-45 degrees  Taken 12/24/2024 2015 by Avelina Weiss, RN  Activity Management:   activity adjusted per tolerance   activity encouraged  Head of Bed (HOB) Positioning: HOB at 30-45 degrees   Goal Outcome Evaluation:

## 2024-12-25 NOTE — PLAN OF CARE
Pt discharged back to [facility]. Family transporting. IV removed, belongings sent. AVS reviewed with son and patient. Copy sent with.

## 2024-12-26 LAB
BACTERIA PLR CULT: NORMAL
GRAM STAIN RESULT: NORMAL
GRAM STAIN RESULT: NORMAL

## 2024-12-26 NOTE — PROGRESS NOTES
Physical Therapy Discharge Summary    Reason for therapy discharge:    Discharged to home with home therapy.    Progress towards therapy goal(s). See goals on Care Plan in [facility] electronic health record for goal details.  Goals partially met.  Barriers to achieving goals:   limited tolerance for therapy.    Therapy recommendation(s):    Continued therapy is recommended.  Rationale/Recommendations:   .Patient would benefit from further rehab to improve functional mobility and safety prior to returning to home

## 2024-12-27 LAB — ADENOSINE DEAMINASE PLR-CCNC: 6 U/L

## 2024-12-28 LAB
BACTERIA PLR CULT: NO GROWTH
GRAM STAIN RESULT: NORMAL
GRAM STAIN RESULT: NORMAL

## 2025-03-11 ENCOUNTER — DOCUMENTATION ONLY (OUTPATIENT)
Dept: GERIATRICS | Facility: CLINIC | Age: 89
End: 2025-03-11
Payer: COMMERCIAL

## 2025-03-12 PROBLEM — H25.13 NUCLEAR SCLEROTIC CATARACT OF BOTH EYES: Status: ACTIVE | Noted: 2021-01-13

## 2025-03-12 PROBLEM — H25.812 COMBINED FORMS OF AGE-RELATED CATARACT OF LEFT EYE: Status: ACTIVE | Noted: 2022-11-02

## 2025-03-12 PROBLEM — H40.1122 PRIMARY OPEN ANGLE GLAUCOMA (POAG) OF LEFT EYE, MODERATE STAGE: Status: ACTIVE | Noted: 2018-05-16

## 2025-03-12 PROBLEM — I34.0 MITRAL VALVE INSUFFICIENCY: Status: ACTIVE | Noted: 2021-07-04

## 2025-03-12 PROBLEM — H25.11 NS (NUCLEAR SCLEROSIS), RIGHT: Status: ACTIVE | Noted: 2022-09-20
